# Patient Record
Sex: FEMALE | Race: WHITE | NOT HISPANIC OR LATINO
[De-identification: names, ages, dates, MRNs, and addresses within clinical notes are randomized per-mention and may not be internally consistent; named-entity substitution may affect disease eponyms.]

---

## 2017-01-23 ENCOUNTER — APPOINTMENT (OUTPATIENT)
Dept: INTERNAL MEDICINE | Facility: CLINIC | Age: 57
End: 2017-01-23

## 2017-01-23 ENCOUNTER — NON-APPOINTMENT (OUTPATIENT)
Age: 57
End: 2017-01-23

## 2017-01-23 VITALS
HEIGHT: 61 IN | SYSTOLIC BLOOD PRESSURE: 148 MMHG | BODY MASS INDEX: 33.04 KG/M2 | DIASTOLIC BLOOD PRESSURE: 82 MMHG | RESPIRATION RATE: 14 BRPM | HEART RATE: 82 BPM | OXYGEN SATURATION: 98 % | WEIGHT: 175 LBS | TEMPERATURE: 97.8 F

## 2017-02-17 ENCOUNTER — APPOINTMENT (OUTPATIENT)
Dept: NEUROLOGY | Facility: CLINIC | Age: 57
End: 2017-02-17

## 2017-02-17 ENCOUNTER — APPOINTMENT (OUTPATIENT)
Dept: OPHTHALMOLOGY | Facility: CLINIC | Age: 57
End: 2017-02-17

## 2017-02-21 ENCOUNTER — APPOINTMENT (OUTPATIENT)
Dept: NEUROLOGY | Facility: CLINIC | Age: 57
End: 2017-02-21

## 2017-02-21 VITALS
DIASTOLIC BLOOD PRESSURE: 66 MMHG | BODY MASS INDEX: 33.61 KG/M2 | HEART RATE: 84 BPM | WEIGHT: 178 LBS | SYSTOLIC BLOOD PRESSURE: 100 MMHG | HEIGHT: 61 IN

## 2017-02-27 ENCOUNTER — APPOINTMENT (OUTPATIENT)
Dept: INTERNAL MEDICINE | Facility: CLINIC | Age: 57
End: 2017-02-27

## 2017-02-27 VITALS
RESPIRATION RATE: 14 BRPM | WEIGHT: 178 LBS | SYSTOLIC BLOOD PRESSURE: 120 MMHG | DIASTOLIC BLOOD PRESSURE: 70 MMHG | BODY MASS INDEX: 33.61 KG/M2 | HEIGHT: 61 IN | HEART RATE: 78 BPM | OXYGEN SATURATION: 98 % | TEMPERATURE: 98 F

## 2017-02-28 LAB
ALBUMIN MFR SERPL ELPH: 59.8 %
ALBUMIN SERPL-MCNC: 3.8 G/DL
ALBUMIN/GLOB SERPL: 1.5 RATIO
ALPHA1 GLOB MFR SERPL ELPH: 4.5 %
ALPHA1 GLOB SERPL ELPH-MCNC: 0.3 G/DL
ALPHA2 GLOB MFR SERPL ELPH: 12.1 %
ALPHA2 GLOB SERPL ELPH-MCNC: 0.8 G/DL
B-GLOBULIN MFR SERPL ELPH: 12.1 %
B-GLOBULIN SERPL ELPH-MCNC: 0.8 G/DL
GAMMA GLOB FLD ELPH-MCNC: 0.7 G/DL
GAMMA GLOB MFR SERPL ELPH: 11.5 %
INTERPRETATION SERPL IEP-IMP: NORMAL
M PROTEIN SPEC IFE-MCNC: NORMAL
PROT SERPL-MCNC: 6.3 G/DL
PROT SERPL-MCNC: 6.3 G/DL

## 2017-03-02 ENCOUNTER — TRANSCRIPTION ENCOUNTER (OUTPATIENT)
Age: 57
End: 2017-03-02

## 2017-03-02 ENCOUNTER — OUTPATIENT (OUTPATIENT)
Dept: OUTPATIENT SERVICES | Facility: HOSPITAL | Age: 57
LOS: 1 days | End: 2017-03-02
Payer: COMMERCIAL

## 2017-03-02 ENCOUNTER — RX RENEWAL (OUTPATIENT)
Age: 57
End: 2017-03-02

## 2017-03-02 ENCOUNTER — APPOINTMENT (OUTPATIENT)
Dept: OPHTHALMOLOGY | Facility: CLINIC | Age: 57
End: 2017-03-02

## 2017-03-02 VITALS
HEIGHT: 62 IN | DIASTOLIC BLOOD PRESSURE: 59 MMHG | WEIGHT: 176.81 LBS | RESPIRATION RATE: 19 BRPM | SYSTOLIC BLOOD PRESSURE: 104 MMHG | OXYGEN SATURATION: 100 % | TEMPERATURE: 98 F | HEART RATE: 74 BPM

## 2017-03-02 VITALS
RESPIRATION RATE: 17 BRPM | HEART RATE: 76 BPM | SYSTOLIC BLOOD PRESSURE: 100 MMHG | OXYGEN SATURATION: 96 % | DIASTOLIC BLOOD PRESSURE: 54 MMHG

## 2017-03-02 DIAGNOSIS — H25.812 COMBINED FORMS OF AGE-RELATED CATARACT, LEFT EYE: ICD-10-CM

## 2017-03-02 DIAGNOSIS — Z98.891 HISTORY OF UTERINE SCAR FROM PREVIOUS SURGERY: Chronic | ICD-10-CM

## 2017-03-02 PROCEDURE — 66984 XCAPSL CTRC RMVL W/O ECP: CPT | Mod: LT

## 2017-03-02 NOTE — ASU DISCHARGE PLAN (ADULT/PEDIATRIC). - NOTIFY
Bleeding that does not stop/Pain not relieved by Medications/Fever greater than 101/Persistent Nausea and Vomiting Bleeding that does not stop/Swelling that continues/Pain not relieved by Medications/Fever greater than 101/Persistent Nausea and Vomiting

## 2017-03-02 NOTE — ASU PREOP CHECKLIST - AS TEMP SITE
Antibiotics sent in  Should not be performing a colonoscopy during an attack.  Should wait weeks/month after antibiotic is done and symptoms resolved   oral

## 2017-03-03 ENCOUNTER — APPOINTMENT (OUTPATIENT)
Dept: OPHTHALMOLOGY | Facility: CLINIC | Age: 57
End: 2017-03-03

## 2017-03-03 PROBLEM — E78.00 PURE HYPERCHOLESTEROLEMIA, UNSPECIFIED: Chronic | Status: ACTIVE | Noted: 2017-03-01

## 2017-03-09 ENCOUNTER — APPOINTMENT (OUTPATIENT)
Dept: OPHTHALMOLOGY | Facility: CLINIC | Age: 57
End: 2017-03-09

## 2017-04-06 ENCOUNTER — APPOINTMENT (OUTPATIENT)
Dept: OPHTHALMOLOGY | Facility: CLINIC | Age: 57
End: 2017-04-06

## 2017-04-27 ENCOUNTER — APPOINTMENT (OUTPATIENT)
Dept: OPHTHALMOLOGY | Facility: CLINIC | Age: 57
End: 2017-04-27

## 2017-05-08 ENCOUNTER — APPOINTMENT (OUTPATIENT)
Dept: INTERNAL MEDICINE | Facility: CLINIC | Age: 57
End: 2017-05-08

## 2017-05-10 ENCOUNTER — RX RENEWAL (OUTPATIENT)
Age: 57
End: 2017-05-10

## 2017-05-10 ENCOUNTER — NON-APPOINTMENT (OUTPATIENT)
Age: 57
End: 2017-05-10

## 2017-05-10 ENCOUNTER — APPOINTMENT (OUTPATIENT)
Dept: INTERNAL MEDICINE | Facility: CLINIC | Age: 57
End: 2017-05-10

## 2017-05-10 VITALS
HEIGHT: 61 IN | RESPIRATION RATE: 14 BRPM | OXYGEN SATURATION: 98 % | BODY MASS INDEX: 37.38 KG/M2 | HEART RATE: 95 BPM | WEIGHT: 198 LBS | TEMPERATURE: 98 F | DIASTOLIC BLOOD PRESSURE: 70 MMHG | SYSTOLIC BLOOD PRESSURE: 120 MMHG

## 2017-05-10 DIAGNOSIS — Z01.818 ENCOUNTER FOR OTHER PREPROCEDURAL EXAMINATION: ICD-10-CM

## 2017-05-18 ENCOUNTER — APPOINTMENT (OUTPATIENT)
Dept: OPHTHALMOLOGY | Facility: HOSPITAL | Age: 57
End: 2017-05-18

## 2017-05-18 ENCOUNTER — OUTPATIENT (OUTPATIENT)
Dept: OUTPATIENT SERVICES | Facility: HOSPITAL | Age: 57
LOS: 1 days | End: 2017-05-18
Payer: COMMERCIAL

## 2017-05-18 VITALS
DIASTOLIC BLOOD PRESSURE: 62 MMHG | RESPIRATION RATE: 22 BRPM | HEART RATE: 77 BPM | OXYGEN SATURATION: 97 % | SYSTOLIC BLOOD PRESSURE: 100 MMHG

## 2017-05-18 VITALS
WEIGHT: 199.3 LBS | HEIGHT: 62 IN | OXYGEN SATURATION: 98 % | DIASTOLIC BLOOD PRESSURE: 72 MMHG | TEMPERATURE: 99 F | SYSTOLIC BLOOD PRESSURE: 125 MMHG | RESPIRATION RATE: 16 BRPM | HEART RATE: 76 BPM

## 2017-05-18 DIAGNOSIS — Z98.891 HISTORY OF UTERINE SCAR FROM PREVIOUS SURGERY: Chronic | ICD-10-CM

## 2017-05-18 DIAGNOSIS — Z98.49 CATARACT EXTRACTION STATUS, UNSPECIFIED EYE: Chronic | ICD-10-CM

## 2017-05-18 DIAGNOSIS — H25.811 COMBINED FORMS OF AGE-RELATED CATARACT, RIGHT EYE: ICD-10-CM

## 2017-05-18 PROCEDURE — 66984 XCAPSL CTRC RMVL W/O ECP: CPT | Mod: RT

## 2017-05-18 PROCEDURE — 66984 XCAPSL CTRC RMVL W/O ECP: CPT | Mod: 79,RT

## 2017-05-18 NOTE — ASU DISCHARGE PLAN (ADULT/PEDIATRIC). - PT EDUC
Implant card (specify)/Intraocular lens implant(IOL), Eye shield with instructions , sunglasses and eye kit given to patient./other (specify)

## 2017-05-18 NOTE — ASU DISCHARGE PLAN (ADULT/PEDIATRIC). - NOTIFY
Fever greater than 101/Pain not relieved by Medications/Bleeding that does not stop/Swelling that continues/Persistent Nausea and Vomiting

## 2017-05-19 ENCOUNTER — APPOINTMENT (OUTPATIENT)
Dept: OPHTHALMOLOGY | Facility: CLINIC | Age: 57
End: 2017-05-19

## 2017-05-20 ENCOUNTER — TRANSCRIPTION ENCOUNTER (OUTPATIENT)
Age: 57
End: 2017-05-20

## 2017-05-22 ENCOUNTER — APPOINTMENT (OUTPATIENT)
Dept: NEUROLOGY | Facility: CLINIC | Age: 57
End: 2017-05-22

## 2017-05-23 ENCOUNTER — APPOINTMENT (OUTPATIENT)
Dept: OPHTHALMOLOGY | Facility: CLINIC | Age: 57
End: 2017-05-23

## 2017-05-25 ENCOUNTER — APPOINTMENT (OUTPATIENT)
Dept: OPHTHALMOLOGY | Facility: CLINIC | Age: 57
End: 2017-05-25

## 2017-05-30 ENCOUNTER — APPOINTMENT (OUTPATIENT)
Dept: NEUROLOGY | Facility: CLINIC | Age: 57
End: 2017-05-30

## 2017-06-19 ENCOUNTER — RX RENEWAL (OUTPATIENT)
Age: 57
End: 2017-06-19

## 2017-07-10 ENCOUNTER — APPOINTMENT (OUTPATIENT)
Dept: NEUROLOGY | Facility: CLINIC | Age: 57
End: 2017-07-10

## 2017-07-10 VITALS
HEART RATE: 77 BPM | DIASTOLIC BLOOD PRESSURE: 75 MMHG | WEIGHT: 198 LBS | SYSTOLIC BLOOD PRESSURE: 118 MMHG | BODY MASS INDEX: 37.38 KG/M2 | HEIGHT: 61 IN

## 2017-07-13 ENCOUNTER — APPOINTMENT (OUTPATIENT)
Dept: OPHTHALMOLOGY | Facility: CLINIC | Age: 57
End: 2017-07-13

## 2017-07-13 DIAGNOSIS — H25.13 AGE-RELATED NUCLEAR CATARACT, BILATERAL: ICD-10-CM

## 2017-09-12 ENCOUNTER — RX RENEWAL (OUTPATIENT)
Age: 57
End: 2017-09-12

## 2017-09-12 RX ORDER — GABAPENTIN 800 MG/1
800 TABLET, COATED ORAL 3 TIMES DAILY
Qty: 90 | Refills: 6 | Status: ACTIVE | COMMUNITY
Start: 2017-02-21 | End: 1900-01-01

## 2017-11-13 ENCOUNTER — TRANSCRIPTION ENCOUNTER (OUTPATIENT)
Age: 57
End: 2017-11-13

## 2017-12-14 ENCOUNTER — RESULT REVIEW (OUTPATIENT)
Age: 57
End: 2017-12-14

## 2017-12-18 ENCOUNTER — OUTPATIENT (OUTPATIENT)
Dept: OUTPATIENT SERVICES | Facility: HOSPITAL | Age: 57
LOS: 1 days | End: 2017-12-18
Payer: COMMERCIAL

## 2017-12-18 ENCOUNTER — APPOINTMENT (OUTPATIENT)
Dept: MAMMOGRAPHY | Facility: CLINIC | Age: 57
End: 2017-12-18
Payer: COMMERCIAL

## 2017-12-18 ENCOUNTER — APPOINTMENT (OUTPATIENT)
Dept: RADIOLOGY | Facility: CLINIC | Age: 57
End: 2017-12-18
Payer: COMMERCIAL

## 2017-12-18 DIAGNOSIS — Z12.31 ENCOUNTER FOR SCREENING MAMMOGRAM FOR MALIGNANT NEOPLASM OF BREAST: ICD-10-CM

## 2017-12-18 DIAGNOSIS — Z13.820 ENCOUNTER FOR SCREENING FOR OSTEOPOROSIS: ICD-10-CM

## 2017-12-18 DIAGNOSIS — Z98.49 CATARACT EXTRACTION STATUS, UNSPECIFIED EYE: Chronic | ICD-10-CM

## 2017-12-18 DIAGNOSIS — Z98.891 HISTORY OF UTERINE SCAR FROM PREVIOUS SURGERY: Chronic | ICD-10-CM

## 2017-12-18 DIAGNOSIS — Z00.8 ENCOUNTER FOR OTHER GENERAL EXAMINATION: ICD-10-CM

## 2017-12-18 PROCEDURE — 77080 DXA BONE DENSITY AXIAL: CPT

## 2017-12-18 PROCEDURE — G0202: CPT | Mod: 26

## 2017-12-18 PROCEDURE — 77063 BREAST TOMOSYNTHESIS BI: CPT | Mod: 26

## 2017-12-18 PROCEDURE — 77063 BREAST TOMOSYNTHESIS BI: CPT

## 2017-12-18 PROCEDURE — 77067 SCR MAMMO BI INCL CAD: CPT

## 2017-12-18 PROCEDURE — 77080 DXA BONE DENSITY AXIAL: CPT | Mod: 26

## 2018-02-16 ENCOUNTER — APPOINTMENT (OUTPATIENT)
Dept: NEUROLOGY | Facility: CLINIC | Age: 58
End: 2018-02-16
Payer: COMMERCIAL

## 2018-02-16 VITALS
BODY MASS INDEX: 38.71 KG/M2 | HEART RATE: 85 BPM | HEIGHT: 61 IN | SYSTOLIC BLOOD PRESSURE: 135 MMHG | WEIGHT: 205 LBS | DIASTOLIC BLOOD PRESSURE: 81 MMHG

## 2018-02-16 DIAGNOSIS — M79.2 NEURALGIA AND NEURITIS, UNSPECIFIED: ICD-10-CM

## 2018-02-16 PROCEDURE — 99213 OFFICE O/P EST LOW 20 MIN: CPT

## 2018-07-24 ENCOUNTER — APPOINTMENT (OUTPATIENT)
Dept: OPHTHALMOLOGY | Facility: CLINIC | Age: 58
End: 2018-07-24
Payer: COMMERCIAL

## 2018-07-24 PROCEDURE — 92014 COMPRE OPH EXAM EST PT 1/>: CPT

## 2018-08-16 ENCOUNTER — APPOINTMENT (OUTPATIENT)
Dept: NEUROLOGY | Facility: CLINIC | Age: 58
End: 2018-08-16

## 2018-11-27 ENCOUNTER — APPOINTMENT (OUTPATIENT)
Dept: BARIATRICS | Facility: CLINIC | Age: 58
End: 2018-11-27
Payer: COMMERCIAL

## 2018-11-27 VITALS
OXYGEN SATURATION: 96 % | DIASTOLIC BLOOD PRESSURE: 70 MMHG | BODY MASS INDEX: 42.58 KG/M2 | WEIGHT: 225.53 LBS | HEIGHT: 61 IN | HEART RATE: 80 BPM | SYSTOLIC BLOOD PRESSURE: 140 MMHG

## 2018-11-27 DIAGNOSIS — Z02.82 ENCOUNTER FOR ADOPTION SERVICES: ICD-10-CM

## 2018-11-27 DIAGNOSIS — R53.81 OTHER MALAISE: ICD-10-CM

## 2018-11-27 DIAGNOSIS — R53.83 OTHER MALAISE: ICD-10-CM

## 2018-11-27 PROCEDURE — 99244 OFF/OP CNSLTJ NEW/EST MOD 40: CPT

## 2018-11-27 RX ORDER — PREDNISOLONE ACETATE 10 MG/ML
1 SUSPENSION/ DROPS OPHTHALMIC 4 TIMES DAILY
Qty: 5 | Refills: 1 | Status: COMPLETED | COMMUNITY
Start: 2017-02-28 | End: 2018-11-27

## 2018-11-27 RX ORDER — MOXIFLOXACIN HYDROCHLORIDE 5 MG/ML
0.5 SOLUTION/ DROPS OPHTHALMIC 4 TIMES DAILY
Qty: 3 | Refills: 1 | Status: COMPLETED | COMMUNITY
Start: 2017-02-28 | End: 2018-11-27

## 2018-11-27 RX ORDER — OFLOXACIN 3 MG/ML
0.3 SOLUTION/ DROPS OPHTHALMIC 4 TIMES DAILY
Qty: 5 | Refills: 2 | Status: COMPLETED | COMMUNITY
Start: 2017-05-10 | End: 2018-11-27

## 2018-11-27 RX ORDER — PREDNISOLONE ACETATE 10 MG/ML
1 SUSPENSION/ DROPS OPHTHALMIC 4 TIMES DAILY
Qty: 10 | Refills: 2 | Status: COMPLETED | COMMUNITY
Start: 2017-05-10 | End: 2018-11-27

## 2018-11-27 RX ORDER — EMPAGLIFLOZIN 25 MG/1
25 TABLET, FILM COATED ORAL DAILY
Refills: 0 | Status: COMPLETED | COMMUNITY
Start: 2017-02-27 | End: 2018-11-27

## 2019-01-02 ENCOUNTER — OUTPATIENT (OUTPATIENT)
Dept: OUTPATIENT SERVICES | Facility: HOSPITAL | Age: 59
LOS: 1 days | End: 2019-01-02
Payer: COMMERCIAL

## 2019-01-02 ENCOUNTER — APPOINTMENT (OUTPATIENT)
Dept: MAMMOGRAPHY | Facility: CLINIC | Age: 59
End: 2019-01-02
Payer: COMMERCIAL

## 2019-01-02 ENCOUNTER — APPOINTMENT (OUTPATIENT)
Dept: ULTRASOUND IMAGING | Facility: CLINIC | Age: 59
End: 2019-01-02
Payer: COMMERCIAL

## 2019-01-02 DIAGNOSIS — Z98.49 CATARACT EXTRACTION STATUS, UNSPECIFIED EYE: Chronic | ICD-10-CM

## 2019-01-02 DIAGNOSIS — Z00.8 ENCOUNTER FOR OTHER GENERAL EXAMINATION: ICD-10-CM

## 2019-01-02 DIAGNOSIS — Z98.891 HISTORY OF UTERINE SCAR FROM PREVIOUS SURGERY: Chronic | ICD-10-CM

## 2019-01-02 PROCEDURE — 93970 EXTREMITY STUDY: CPT | Mod: 26

## 2019-01-02 PROCEDURE — 93970 EXTREMITY STUDY: CPT

## 2019-01-02 PROCEDURE — 77067 SCR MAMMO BI INCL CAD: CPT | Mod: 26

## 2019-01-02 PROCEDURE — 77063 BREAST TOMOSYNTHESIS BI: CPT | Mod: 26

## 2019-01-02 PROCEDURE — 77067 SCR MAMMO BI INCL CAD: CPT

## 2019-01-02 PROCEDURE — 76700 US EXAM ABDOM COMPLETE: CPT | Mod: 26

## 2019-01-02 PROCEDURE — 77063 BREAST TOMOSYNTHESIS BI: CPT

## 2019-01-02 PROCEDURE — 76700 US EXAM ABDOM COMPLETE: CPT

## 2019-01-07 ENCOUNTER — APPOINTMENT (OUTPATIENT)
Dept: INTERNAL MEDICINE | Facility: CLINIC | Age: 59
End: 2019-01-07
Payer: COMMERCIAL

## 2019-01-07 VITALS
WEIGHT: 225 LBS | HEIGHT: 61 IN | SYSTOLIC BLOOD PRESSURE: 126 MMHG | BODY MASS INDEX: 42.48 KG/M2 | OXYGEN SATURATION: 99 % | RESPIRATION RATE: 14 BRPM | HEART RATE: 91 BPM | DIASTOLIC BLOOD PRESSURE: 78 MMHG

## 2019-01-07 PROCEDURE — 99213 OFFICE O/P EST LOW 20 MIN: CPT

## 2019-01-07 NOTE — HISTORY OF PRESENT ILLNESS
[FreeTextEntry1] : obesity [de-identified] : Pt needs work up for bariatric surgery. She has all her appts scheduled. Planning to do gastric sleeve. Has been overweight her whole life. Diabetic and unable to lose weight. Exercises regularly at orange [employer] and not losing weight. Has diabetes for 15 yrs.

## 2019-01-07 NOTE — PHYSICAL EXAM
[No Acute Distress] : no acute distress [Well Nourished] : well nourished [Well Developed] : well developed [Well-Appearing] : well-appearing [Normal Rate] : normal rate  [Regular Rhythm] : with a regular rhythm [No Edema] : there was no peripheral edema [Soft] : abdomen soft [Non Tender] : non-tender [Normal Affect] : the affect was normal [Normal Insight/Judgement] : insight and judgment were intact

## 2019-01-18 ENCOUNTER — APPOINTMENT (OUTPATIENT)
Dept: CARDIOLOGY | Facility: CLINIC | Age: 59
End: 2019-01-18
Payer: COMMERCIAL

## 2019-01-18 ENCOUNTER — APPOINTMENT (OUTPATIENT)
Dept: GASTROENTEROLOGY | Facility: CLINIC | Age: 59
End: 2019-01-18
Payer: COMMERCIAL

## 2019-01-18 ENCOUNTER — NON-APPOINTMENT (OUTPATIENT)
Age: 59
End: 2019-01-18

## 2019-01-18 VITALS
DIASTOLIC BLOOD PRESSURE: 75 MMHG | WEIGHT: 235 LBS | BODY MASS INDEX: 44.37 KG/M2 | HEIGHT: 61 IN | OXYGEN SATURATION: 95 % | SYSTOLIC BLOOD PRESSURE: 123 MMHG | HEART RATE: 79 BPM

## 2019-01-18 VITALS
DIASTOLIC BLOOD PRESSURE: 80 MMHG | WEIGHT: 235 LBS | HEART RATE: 75 BPM | HEIGHT: 61 IN | SYSTOLIC BLOOD PRESSURE: 132 MMHG | OXYGEN SATURATION: 94 % | BODY MASS INDEX: 44.37 KG/M2

## 2019-01-18 DIAGNOSIS — Z01.810 ENCOUNTER FOR PREPROCEDURAL CARDIOVASCULAR EXAMINATION: ICD-10-CM

## 2019-01-18 DIAGNOSIS — Z01.818 ENCOUNTER FOR OTHER PREPROCEDURAL EXAMINATION: ICD-10-CM

## 2019-01-18 DIAGNOSIS — Z12.11 ENCOUNTER FOR SCREENING FOR MALIGNANT NEOPLASM OF COLON: ICD-10-CM

## 2019-01-18 PROCEDURE — 99244 OFF/OP CNSLTJ NEW/EST MOD 40: CPT

## 2019-01-18 PROCEDURE — 99245 OFF/OP CONSLTJ NEW/EST HI 55: CPT | Mod: 25

## 2019-01-18 PROCEDURE — 93000 ELECTROCARDIOGRAM COMPLETE: CPT

## 2019-01-20 NOTE — PHYSICAL EXAM
[General Appearance - Alert] : alert [General Appearance - In No Acute Distress] : in no acute distress [Sclera] : the sclera and conjunctiva were normal [Extraocular Movements] : extraocular movements were intact [Outer Ear] : the ears and nose were normal in appearance [Hearing Threshold Finger Rub Not [place]] : hearing was normal [Neck Appearance] : the appearance of the neck was normal [Neck Cervical Mass (___cm)] : no neck mass was observed [Auscultation Breath Sounds / Voice Sounds] : lungs were clear to auscultation bilaterally [Heart Rate And Rhythm] : heart rate was normal and rhythm regular [Heart Sounds] : normal S1 and S2 [Heart Sounds Gallop] : no gallops [Murmurs] : no murmurs [Heart Sounds Pericardial Friction Rub] : no pericardial rub [Bowel Sounds] : normal bowel sounds [Abdomen Soft] : soft [Abdomen Tenderness] : non-tender [Abdomen Mass (___ Cm)] : no abdominal mass palpated [Cervical Lymph Nodes Enlarged Posterior Bilaterally] : posterior cervical [Cervical Lymph Nodes Enlarged Anterior Bilaterally] : anterior cervical [Abnormal Walk] : normal gait [Nail Clubbing] : no clubbing  or cyanosis of the fingernails [Skin Color & Pigmentation] : normal skin color and pigmentation [] : no rash [Oriented To Time, Place, And Person] : oriented to person, place, and time [Impaired Insight] : insight and judgment were intact [Affect] : the affect was normal [FreeTextEntry1] : Central obesity

## 2019-01-20 NOTE — ASSESSMENT
[FreeTextEntry1] : 58-year-old woman with history of diabetes, hypertension, obesity who is considering gastric sleeve. She is here for evaluation of an upper endoscopy.\par \par I had a long discussion with the patient with regards to getting ready for a bariatric surgery. She does need an upper endoscopy to make sure she doesn't have any peptic ulcer disease, upper GI tract lesions, Law's esophagus, large hiatal hernia etc. This will be done with anesthesia. The risks of the procedure including perforation, bleeding, and risks of anesthesia were discussed with the patient who wants to proceed. \par \par Since she has never had a colonoscopy as screening to prevent colon cancer, I have discussed the procedure with her to r/o colon polyps etc under MAC.  Risks of colonoscopy were also reviewed including perforation, bleeding, infection, risks of anesthesia were discussed and patient would like to proceed.  \par \par The patient would like to have both procedures at the same time in a hospital setting.\par \par

## 2019-01-20 NOTE — HISTORY OF PRESENT ILLNESS
[de-identified] : 58-year-old woman with history of diabetes, hypertension, obesity who is considering gastric sleeve. She is here for evaluation of an upper endoscopy.\par \par The patient reports feeling well and denies having any heartburn, reflux, dysphasia, odynophagia, nausea, vomiting, constipation, diarrhea, melena and bright red blood per rectum.\par \par She's never had an upper endoscopy or a routine screening colonoscopy.\par \par She denies any family history of gastrointestinal cancers.\par \par She's never been a smoker and has been a social drinker\par \par All other review of systems are negative.  Denies cardiac symptoms.\par

## 2019-01-24 ENCOUNTER — APPOINTMENT (OUTPATIENT)
Dept: CARDIOLOGY | Facility: CLINIC | Age: 59
End: 2019-01-24
Payer: COMMERCIAL

## 2019-01-24 LAB
25(OH)D3 SERPL-MCNC: 26.4 NG/ML
ALBUMIN SERPL ELPH-MCNC: 4.2 G/DL
ALP BLD-CCNC: 74 U/L
ALT SERPL-CCNC: 19 U/L
ANION GAP SERPL CALC-SCNC: 12 MMOL/L
AST SERPL-CCNC: 17 U/L
BASOPHILS # BLD AUTO: 0.05 K/UL
BASOPHILS NFR BLD AUTO: 0.7 %
BILIRUB SERPL-MCNC: 0.4 MG/DL
BUN SERPL-MCNC: 20 MG/DL
CALCIUM SERPL-MCNC: 9.2 MG/DL
CHLORIDE SERPL-SCNC: 99 MMOL/L
CHOLEST SERPL-MCNC: 201 MG/DL
CHOLEST/HDLC SERPL: 4 RATIO
CO2 SERPL-SCNC: 26 MMOL/L
CREAT SERPL-MCNC: 0.91 MG/DL
EOSINOPHIL # BLD AUTO: 0.25 K/UL
EOSINOPHIL NFR BLD AUTO: 3.6 %
FERRITIN SERPL-MCNC: 57 NG/ML
FOLATE SERPL-MCNC: 17.7 NG/ML
GLUCOSE SERPL-MCNC: 146 MG/DL
HBA1C MFR BLD HPLC: 8.6 %
HCT VFR BLD CALC: 43.7 %
HDLC SERPL-MCNC: 50 MG/DL
HGB BLD-MCNC: 13.9 G/DL
IMM GRANULOCYTES NFR BLD AUTO: 0.3 %
INR PPP: 0.91 RATIO
IRON SATN MFR SERPL: 15 %
IRON SERPL-MCNC: 55 UG/DL
LDLC SERPL CALC-MCNC: 119 MG/DL
LYMPHOCYTES # BLD AUTO: 2.1 K/UL
LYMPHOCYTES NFR BLD AUTO: 30.3 %
MAN DIFF?: NORMAL
MCHC RBC-ENTMCNC: 27.8 PG
MCHC RBC-ENTMCNC: 31.8 GM/DL
MCV RBC AUTO: 87.4 FL
MONOCYTES # BLD AUTO: 0.59 K/UL
MONOCYTES NFR BLD AUTO: 8.5 %
NEUTROPHILS # BLD AUTO: 3.92 K/UL
NEUTROPHILS NFR BLD AUTO: 56.6 %
PLATELET # BLD AUTO: 376 K/UL
POTASSIUM SERPL-SCNC: 4.2 MMOL/L
PROT SERPL-MCNC: 7.1 G/DL
PT BLD: 10.3 SEC
RBC # BLD: 5 M/UL
RBC # FLD: 14.7 %
SODIUM SERPL-SCNC: 136 MMOL/L
T4 FREE SERPL-MCNC: 1.1 NG/DL
TIBC SERPL-MCNC: 378 UG/DL
TRIGL SERPL-MCNC: 160 MG/DL
TSH SERPL-ACNC: 2.07 UIU/ML
UIBC SERPL-MCNC: 323 UG/DL
VIT A SERPL-MCNC: 67 UG/DL
VIT B1 SERPL-MCNC: 162.1 NMOL/L
VIT B12 SERPL-MCNC: 983 PG/ML
VIT B2 SERPL-MCNC: 215 UG/L
VIT B6 SERPL-MCNC: 6 UG/L
WBC # FLD AUTO: 6.93 K/UL
ZINC SERPL-MCNC: 66 UG/DL

## 2019-01-24 PROCEDURE — 93015 CV STRESS TEST SUPVJ I&R: CPT

## 2019-01-24 PROCEDURE — 93306 TTE W/DOPPLER COMPLETE: CPT

## 2019-01-24 PROCEDURE — 78452 HT MUSCLE IMAGE SPECT MULT: CPT

## 2019-01-24 PROCEDURE — A9500: CPT

## 2019-01-30 ENCOUNTER — APPOINTMENT (OUTPATIENT)
Dept: BARIATRICS | Facility: CLINIC | Age: 59
End: 2019-01-30
Payer: COMMERCIAL

## 2019-01-30 ENCOUNTER — APPOINTMENT (OUTPATIENT)
Dept: BARIATRICS/WEIGHT MGMT | Facility: CLINIC | Age: 59
End: 2019-01-30
Payer: COMMERCIAL

## 2019-01-30 VITALS
BODY MASS INDEX: 44.54 KG/M2 | WEIGHT: 235.89 LBS | HEIGHT: 61 IN | HEART RATE: 77 BPM | OXYGEN SATURATION: 93 % | SYSTOLIC BLOOD PRESSURE: 118 MMHG | DIASTOLIC BLOOD PRESSURE: 80 MMHG

## 2019-01-30 DIAGNOSIS — E11.42 TYPE 2 DIABETES MELLITUS WITH DIABETIC POLYNEUROPATHY: ICD-10-CM

## 2019-01-30 PROCEDURE — 90791 PSYCH DIAGNOSTIC EVALUATION: CPT

## 2019-01-30 PROCEDURE — 99214 OFFICE O/P EST MOD 30 MIN: CPT

## 2019-01-31 ENCOUNTER — APPOINTMENT (OUTPATIENT)
Dept: CARDIOLOGY | Facility: CLINIC | Age: 59
End: 2019-01-31

## 2019-02-04 NOTE — ASSESSMENT
[FreeTextEntry1] : 58 year old F undergoing workup for laparoscopic sleeve gastrectomy here for PRE OP  VISIT. Weight gain since last visit.  Patient is currently in the process of completing her workup as well as a medically supervised diet.\par \par Will schedule surgery once workup is complete.DISCUSSED AND REVIEWED FURTHER REQUIREMENTS NEEDED PRIOR TO SCHEDULING SURGERY. \par \par Nutrition pre op classes scheduled, One on one with nutritionist - 2/14/2019, Psych consult completed on 1/30/2019, Needs letter of support, Upper EGD scheduled for next week. Needs final cardiac and pulmonary clearance and testing forwarded to office. Endocrine clearance and instructions regarding oral medication and insulin. Completed - diet history, routine labs, and imaging studies.\par \par \par Nutritional counseling has been provided. The patient is encouraged to remain calorie conscious and continue a low fat, low carbohydrate, protein focus diet. Pt encouraged to participate in a daily exercise regimen incorporating cardio and strength training. \par \par Return to office in 4-6 weeks or earlier if any concerns. \par

## 2019-02-04 NOTE — REVIEW OF SYSTEMS
[Recent Change In Weight] : ~T recent weight change [Negative] : Heme/Lymph [Fever] : no fever [Dysphagia] : no dysphagia [Chest Pain] : no chest pain [Palpitations] : no palpitations [Shortness Of Breath] : no shortness of breath [Wheezing] : no wheezing [Cough] : no cough [SOB on Exertion] : no shortness of breath during exertion [Abdominal Pain] : no abdominal pain [Vomiting] : no vomiting [Constipation] : no constipation [Diarrhea] : no diarrhea [Reflux/Heartburn] : no reflux/ heartburn [Dysuria] : no dysuria [Incontinence] : no incontinence [Joint Pain] : no joint pain [Joint Stiffness] : no joint stiffness [Muscle Pain] : no muscle pain [FreeTextEntry2] : weight loss since last visit.

## 2019-02-04 NOTE — HISTORY OF PRESENT ILLNESS
[de-identified] : 58 year old F undergoing workup for laparoscopic sleeve gastrectomy here for PRE OP  VISIT. Weight gain since last visit.  Patient is currently in the process of completing her workup as well as a medically supervised diet. Patient is making efforts to improve food choices and increased activity.Pt is following a protein focused diet - 3 meals a day - consuming a sufficient amount of zero calorie liquid.  Patient knows she needs to lose weight prior to surgery.Exercising regularly as recommended .All questions were answered.FURTHER REQUIREMENTS PRIOR TO SCHEDULING SURGERY : Nutrition pre op classes scheduled, One on one with nutritionist - 2/14/2019, Psych consult completed on 1/30/2019, Needs letter of support, Upper EGD scheduled for next week. Needs final cardiac and pulmonary clearance and testing forwarded to office. Endocrine clearance and instructions regarding oral medication and insulin. \par

## 2019-02-04 NOTE — PHYSICAL EXAM
[Obese, well nourished, in no acute distress] : obese, well nourished, in no acute distress [Normal] : affect appropriate [de-identified] : normoactive bowel sounds, soft and non tender, no hepatosplenomegaly or masses appreciated.

## 2019-02-06 ENCOUNTER — TRANSCRIPTION ENCOUNTER (OUTPATIENT)
Age: 59
End: 2019-02-06

## 2019-02-07 ENCOUNTER — APPOINTMENT (OUTPATIENT)
Dept: GASTROENTEROLOGY | Facility: HOSPITAL | Age: 59
End: 2019-02-07

## 2019-02-07 ENCOUNTER — RESULT REVIEW (OUTPATIENT)
Age: 59
End: 2019-02-07

## 2019-02-07 ENCOUNTER — OUTPATIENT (OUTPATIENT)
Dept: OUTPATIENT SERVICES | Facility: HOSPITAL | Age: 59
LOS: 1 days | End: 2019-02-07
Payer: COMMERCIAL

## 2019-02-07 DIAGNOSIS — Z98.891 HISTORY OF UTERINE SCAR FROM PREVIOUS SURGERY: Chronic | ICD-10-CM

## 2019-02-07 DIAGNOSIS — Z01.818 ENCOUNTER FOR OTHER PREPROCEDURAL EXAMINATION: ICD-10-CM

## 2019-02-07 DIAGNOSIS — K20.9 ESOPHAGITIS, UNSPECIFIED: ICD-10-CM

## 2019-02-07 DIAGNOSIS — Z98.49 CATARACT EXTRACTION STATUS, UNSPECIFIED EYE: Chronic | ICD-10-CM

## 2019-02-07 DIAGNOSIS — Z12.11 ENCOUNTER FOR SCREENING FOR MALIGNANT NEOPLASM OF COLON: ICD-10-CM

## 2019-02-07 PROCEDURE — 88305 TISSUE EXAM BY PATHOLOGIST: CPT

## 2019-02-07 PROCEDURE — 88312 SPECIAL STAINS GROUP 1: CPT | Mod: 26

## 2019-02-07 PROCEDURE — 88312 SPECIAL STAINS GROUP 1: CPT

## 2019-02-07 PROCEDURE — 88305 TISSUE EXAM BY PATHOLOGIST: CPT | Mod: 26

## 2019-02-07 PROCEDURE — 88313 SPECIAL STAINS GROUP 2: CPT | Mod: 26

## 2019-02-07 PROCEDURE — 43239 EGD BIOPSY SINGLE/MULTIPLE: CPT

## 2019-02-07 PROCEDURE — 45380 COLONOSCOPY AND BIOPSY: CPT | Mod: PT

## 2019-02-07 PROCEDURE — 88313 SPECIAL STAINS GROUP 2: CPT

## 2019-02-07 PROCEDURE — 45380 COLONOSCOPY AND BIOPSY: CPT | Mod: 33

## 2019-02-07 PROCEDURE — 82962 GLUCOSE BLOOD TEST: CPT

## 2019-02-08 NOTE — REVIEW OF SYSTEMS
[Recent Weight Gain (___ Lbs)] : recent [unfilled] ~Ulb weight gain [Negative] : Heme/Lymph [Chest  Pressure] : no chest pressure [Chest Pain] : no chest pain [Palpitations] : no palpitations

## 2019-02-08 NOTE — HISTORY OF PRESENT ILLNESS
[FreeTextEntry1] : Erik Ortiz is a 58 year old woman with a history of diabetes mellitus complicated by peripheral neuropathy, hypertension, morbid obesity, hypertension, obstructive sleep apnea, and hyperlipidemia who presents for cardiology examination prior to upcoming bariatric surgery - she plans to have laparoscopic sleeve for gastrectomy with Dr. Neal in the upcoming months.  She has no known history of heart disease - family history is nebulous because she is adopted.  She describes a good functional status and exercise capacity.  She experiences dyspnea with exertion that she attributes to deconditioning but no angina.  She has had previous surgical procedures ( section, cataract surgery); no previous complications from anesthesia or bleeding.

## 2019-02-08 NOTE — PHYSICAL EXAM
[Well Groomed] : well groomed [General Appearance - In No Acute Distress] : no acute distress [Eyelids - No Xanthelasma] : the eyelids demonstrated no xanthelasmas [No Oral Cyanosis] : no oral cyanosis [Heart Rate And Rhythm] : heart rate and rhythm were normal [Heart Sounds] : normal S1 and S2 [Edema] : no peripheral edema present [Respiration, Rhythm And Depth] : normal respiratory rhythm and effort [Auscultation Breath Sounds / Voice Sounds] : lungs were clear to auscultation bilaterally [Bowel Sounds] : normal bowel sounds [Abdomen Soft] : soft [Abdomen Tenderness] : non-tender [Abnormal Walk] : normal gait [Gait - Sufficient For Exercise Testing] : the gait was sufficient for exercise testing [Nail Clubbing] : no clubbing of the fingernails [Cyanosis, Localized] : no localized cyanosis [] : no rash [Oriented To Time, Place, And Person] : oriented to person, place, and time [Impaired Insight] : insight and judgment were intact [Affect] : the affect was normal [Mood] : the mood was normal [FreeTextEntry1] : No carotid bruit

## 2019-02-08 NOTE — DISCUSSION/SUMMARY
[FreeTextEntry1] : \par Preoperative cardiac examination: Mrs. Ortiz has several clinical predictors of perioperative cardiac complications, including diabetes mellitus;  I recommend noninvasive cardiac testing prior to surgery  - she will return for echocardiography and ischemia evaluate (stress test); a final assessment of perioperative cardiac risk will be made once these tests have been completed.\par \par Hypertension: Satisfactory control; continue ramipril and HCTZ.\par \par Diabetes mellitus: Suboptimal control; patient says her hemoglobin A1c has varied between 7.5 and 13%; most recent measurement approximately 8%.

## 2019-02-08 NOTE — ADDENDUM
[FreeTextEntry1] : Addendum (2/8/19):\par Aleida Ortiz returned for exercise stress testing with myocardial perfusion imaging on January 24, 2019. She completed 7 minutes of the Conor protocol achieving 90% of maximal predicted heart rate and 8 METS.  She did not experience angina with exercise and there were no ischemic ECG changes or exercise induced arrhythmias.  Myocardial perfusion SPECT scan was normal with no clear evidence of infarction or inducible ischemia. LVEF was calculated at greater than 70%. Echocardiography demonstrated normal left ventricular size and systolic function, mild diastolic dysfunction, mild tricuspid regurgitation, minimal mitral regurgitation, and normal estimated pulmonary artery systolic pressure.\par \par Ms. Ortiz appears optimized from a cardiac standpoint for elective bariatric surgery; no additional cardiac testing is planned.

## 2019-02-14 ENCOUNTER — APPOINTMENT (OUTPATIENT)
Dept: BARIATRICS/WEIGHT MGMT | Facility: CLINIC | Age: 59
End: 2019-02-14

## 2019-02-15 PROBLEM — K20.9 ESOPHAGITIS: Status: ACTIVE | Noted: 2019-02-15

## 2019-02-21 ENCOUNTER — MESSAGE (OUTPATIENT)
Age: 59
End: 2019-02-21

## 2019-02-22 ENCOUNTER — APPOINTMENT (OUTPATIENT)
Dept: BARIATRICS/WEIGHT MGMT | Facility: CLINIC | Age: 59
End: 2019-02-22
Payer: COMMERCIAL

## 2019-02-22 VITALS — WEIGHT: 233 LBS | BODY MASS INDEX: 43.99 KG/M2 | HEIGHT: 61 IN

## 2019-02-22 PROCEDURE — 97802 MEDICAL NUTRITION INDIV IN: CPT

## 2019-03-06 ENCOUNTER — TRANSCRIPTION ENCOUNTER (OUTPATIENT)
Age: 59
End: 2019-03-06

## 2019-03-22 RX ORDER — SODIUM SULFATE, POTASSIUM SULFATE, MAGNESIUM SULFATE 17.5; 3.13; 1.6 G/ML; G/ML; G/ML
17.5-3.13-1.6 SOLUTION, CONCENTRATE ORAL
Qty: 1 | Refills: 0 | Status: DISCONTINUED | COMMUNITY
Start: 2019-01-20 | End: 2019-03-22

## 2019-03-26 ENCOUNTER — APPOINTMENT (OUTPATIENT)
Dept: BARIATRICS | Facility: CLINIC | Age: 59
End: 2019-03-26
Payer: COMMERCIAL

## 2019-03-26 ENCOUNTER — OUTPATIENT (OUTPATIENT)
Dept: OUTPATIENT SERVICES | Facility: HOSPITAL | Age: 59
LOS: 1 days | End: 2019-03-26
Payer: COMMERCIAL

## 2019-03-26 VITALS
WEIGHT: 235.01 LBS | HEIGHT: 61 IN | SYSTOLIC BLOOD PRESSURE: 126 MMHG | DIASTOLIC BLOOD PRESSURE: 80 MMHG | OXYGEN SATURATION: 98 % | HEART RATE: 80 BPM | BODY MASS INDEX: 44.37 KG/M2

## 2019-03-26 VITALS
HEIGHT: 61 IN | SYSTOLIC BLOOD PRESSURE: 122 MMHG | WEIGHT: 235.89 LBS | HEART RATE: 76 BPM | DIASTOLIC BLOOD PRESSURE: 70 MMHG | OXYGEN SATURATION: 97 % | RESPIRATION RATE: 16 BRPM | TEMPERATURE: 98 F

## 2019-03-26 DIAGNOSIS — Z13.21 ENCOUNTER FOR SCREENING FOR NUTRITIONAL DISORDER: ICD-10-CM

## 2019-03-26 DIAGNOSIS — Z98.891 HISTORY OF UTERINE SCAR FROM PREVIOUS SURGERY: Chronic | ICD-10-CM

## 2019-03-26 DIAGNOSIS — Z98.49 CATARACT EXTRACTION STATUS, UNSPECIFIED EYE: Chronic | ICD-10-CM

## 2019-03-26 DIAGNOSIS — Z01.818 ENCOUNTER FOR OTHER PREPROCEDURAL EXAMINATION: ICD-10-CM

## 2019-03-26 DIAGNOSIS — E66.01 MORBID (SEVERE) OBESITY DUE TO EXCESS CALORIES: ICD-10-CM

## 2019-03-26 DIAGNOSIS — I10 ESSENTIAL (PRIMARY) HYPERTENSION: ICD-10-CM

## 2019-03-26 DIAGNOSIS — Z13.220 ENCOUNTER FOR SCREENING FOR LIPOID DISORDERS: ICD-10-CM

## 2019-03-26 DIAGNOSIS — E11.9 TYPE 2 DIABETES MELLITUS WITHOUT COMPLICATIONS: ICD-10-CM

## 2019-03-26 DIAGNOSIS — Z13.6 ENCOUNTER FOR SCREENING FOR LIPOID DISORDERS: ICD-10-CM

## 2019-03-26 DIAGNOSIS — G47.33 OBSTRUCTIVE SLEEP APNEA (ADULT) (PEDIATRIC): ICD-10-CM

## 2019-03-26 DIAGNOSIS — Z13.29 ENCOUNTER FOR SCREENING FOR OTHER SUSPECTED ENDOCRINE DISORDER: ICD-10-CM

## 2019-03-26 PROBLEM — G47.30 SLEEP APNEA, UNSPECIFIED: Chronic | Status: ACTIVE | Noted: 2019-03-26

## 2019-03-26 PROBLEM — E11.40 TYPE 2 DIABETES MELLITUS WITH DIABETIC NEUROPATHY, UNSPECIFIED: Chronic | Status: ACTIVE | Noted: 2017-03-02

## 2019-03-26 LAB
ANION GAP SERPL CALC-SCNC: 9 MMOL/L — SIGNIFICANT CHANGE UP (ref 5–17)
BLD GP AB SCN SERPL QL: SIGNIFICANT CHANGE UP
BUN SERPL-MCNC: 25 MG/DL — HIGH (ref 7–23)
CALCIUM SERPL-MCNC: 10.2 MG/DL — SIGNIFICANT CHANGE UP (ref 8.4–10.5)
CHLORIDE SERPL-SCNC: 98 MMOL/L — SIGNIFICANT CHANGE UP (ref 96–108)
CO2 SERPL-SCNC: 28 MMOL/L — SIGNIFICANT CHANGE UP (ref 22–31)
CREAT SERPL-MCNC: 1.01 MG/DL — SIGNIFICANT CHANGE UP (ref 0.5–1.3)
GLUCOSE SERPL-MCNC: 182 MG/DL — HIGH (ref 70–99)
HBA1C BLD-MCNC: 8.4 % — HIGH (ref 4–5.6)
HCT VFR BLD CALC: 45.5 % — HIGH (ref 34.5–45)
HGB BLD-MCNC: 14.9 G/DL — SIGNIFICANT CHANGE UP (ref 11.5–15.5)
MCHC RBC-ENTMCNC: 27.6 PG — SIGNIFICANT CHANGE UP (ref 27–34)
MCHC RBC-ENTMCNC: 32.7 GM/DL — SIGNIFICANT CHANGE UP (ref 32–36)
MCV RBC AUTO: 84.3 FL — SIGNIFICANT CHANGE UP (ref 80–100)
NRBC # BLD: 0 /100 WBCS — SIGNIFICANT CHANGE UP (ref 0–0)
PLATELET # BLD AUTO: 402 K/UL — HIGH (ref 150–400)
POTASSIUM SERPL-MCNC: 4.8 MMOL/L — SIGNIFICANT CHANGE UP (ref 3.5–5.3)
POTASSIUM SERPL-SCNC: 4.8 MMOL/L — SIGNIFICANT CHANGE UP (ref 3.5–5.3)
RBC # BLD: 5.4 M/UL — HIGH (ref 3.8–5.2)
RBC # FLD: 13 % — SIGNIFICANT CHANGE UP (ref 10.3–14.5)
SODIUM SERPL-SCNC: 135 MMOL/L — SIGNIFICANT CHANGE UP (ref 135–145)
WBC # BLD: 9.49 K/UL — SIGNIFICANT CHANGE UP (ref 3.8–10.5)
WBC # FLD AUTO: 9.49 K/UL — SIGNIFICANT CHANGE UP (ref 3.8–10.5)

## 2019-03-26 PROCEDURE — 99214 OFFICE O/P EST MOD 30 MIN: CPT

## 2019-03-26 RX ORDER — DEXTROSE 50 % IN WATER 50 %
12.5 SYRINGE (ML) INTRAVENOUS ONCE
Qty: 0 | Refills: 0 | Status: DISCONTINUED | OUTPATIENT
Start: 2019-03-26 | End: 2019-04-10

## 2019-03-26 RX ORDER — DIETHYLPROPION HYDROCHLORIDE 25 MG/1
25 TABLET ORAL DAILY
Refills: 0 | Status: DISCONTINUED | COMMUNITY
End: 2019-03-26

## 2019-03-26 RX ORDER — GLIMEPIRIDE 1 MG
1 TABLET ORAL
Qty: 0 | Refills: 0 | COMMUNITY

## 2019-03-26 RX ORDER — DEXTROSE 50 % IN WATER 50 %
25 SYRINGE (ML) INTRAVENOUS ONCE
Qty: 0 | Refills: 0 | Status: DISCONTINUED | OUTPATIENT
Start: 2019-03-26 | End: 2019-04-10

## 2019-03-26 RX ORDER — DEXTROSE 50 % IN WATER 50 %
15 SYRINGE (ML) INTRAVENOUS ONCE
Qty: 0 | Refills: 0 | Status: DISCONTINUED | OUTPATIENT
Start: 2019-03-26 | End: 2019-04-10

## 2019-03-26 RX ORDER — SODIUM CHLORIDE 9 MG/ML
1000 INJECTION, SOLUTION INTRAVENOUS
Qty: 0 | Refills: 0 | Status: DISCONTINUED | OUTPATIENT
Start: 2019-03-26 | End: 2019-04-10

## 2019-03-26 RX ORDER — GLUCAGON INJECTION, SOLUTION 0.5 MG/.1ML
1 INJECTION, SOLUTION SUBCUTANEOUS ONCE
Qty: 0 | Refills: 0 | Status: DISCONTINUED | OUTPATIENT
Start: 2019-03-26 | End: 2019-04-10

## 2019-03-26 NOTE — H&P PST ADULT - NSICDXPASTMEDICALHX_GEN_ALL_CORE_FT
PAST MEDICAL HISTORY:  Diabetic neuropathy feet    Gastritis     Hypercholesterolemia     Hypertension     Sleep apnea CPAP for 1 1/2 months  nasal mask    Type 2 diabetes mellitus  PAST MEDICAL HISTORY:  Diabetic neuropathy feet    Gastritis     Hypercholesterolemia     Hypertension     Obesity, morbid, BMI 40.0-49.9     Sleep apnea CPAP for 1 1/2 months  nasal mask    Type 2 diabetes mellitus

## 2019-03-26 NOTE — H&P PST ADULT - NSICDXPROBLEM_GEN_ALL_CORE_FT
PROBLEM DIAGNOSES  Problem: Obesity, morbid, BMI 40.0-49.9  Assessment and Plan: LAPAROSCOPIC SLEEVE GASTRECTOMY  MEDICAL CLEARANCE  PRE OP INSTRUCTIONS      R/O PROBLEM DIAGNOSES  Problem: Sleep apnea  Assessment and Plan: RESPIRATORY CONSULT REQUESTED, UNABLE TO ATTEND  CPAP INSTRUCTIONS REVIEWED WITH PATIENT  INFORMATION FORWARDED TO RY    Problem: Type 2 diabetes mellitus  Assessment and Plan: INSTRUCTION GIVEN ON METFORMIN, TOUJEO AND SAXENDA  PATIENT WILL REFER TO ENDOCRINOLOGIST FOR ANY QUESTIONS

## 2019-03-26 NOTE — REVIEW OF SYSTEMS
[Negative] : Endocrine [Fever] : no fever [Chills] : no chills [Night Sweats] : no night sweats [Fatigue] : no fatigue [Recent Change In Weight] : ~T no recent weight change [Eye Pain] : no eye pain [Red Eyes] : eyes not red [Vision Problems] : no vision problems [Dysphagia] : no dysphagia [Hoarseness] : no hoarseness [Odynophagia] : no odynophagia [Chest Pain] : no chest pain [Palpitations] : no palpitations [Lower Ext Edema] : no lower extremity edema [Shortness Of Breath] : no shortness of breath [Wheezing] : no wheezing [Cough] : no cough [Abdominal Pain] : no abdominal pain [Vomiting] : no vomiting [Constipation] : no constipation [Diarrhea] : no diarrhea [Reflux/Heartburn] : no reflux/ heartburn [Dysuria] : no dysuria [Incontinence] : no incontinence [Joint Pain] : no joint pain [Joint Stiffness] : no joint stiffness [Skin Rash] : no skin rash [Confused] : no confusion [Dizziness] : no dizziness [Fainting] : no fainting

## 2019-03-26 NOTE — HISTORY OF PRESENT ILLNESS
[de-identified] : 58 year old female with longstanding history of morbid obesity completed workup for laparoscopic sleeve gastrectomy presents today for preoperative visit. Patient weight stable since last visit. Patient is scheduled for surgery on April 8 and had PST this AM. She started modified liquid diet yesterday and stopped glimepiride and invokana as directed by endocrinologist. She has severe RADHA and is using AutoPAP nightly. She has history of mild motion sickness and denies problems with urinary retention.

## 2019-03-26 NOTE — PHYSICAL EXAM
[Obese, well nourished, in no acute distress] : obese, well nourished, in no acute distress [Normal] : affect appropriate [de-identified] : obese, soft, nontender, no evidence of hernia.

## 2019-03-26 NOTE — ASSESSMENT
[FreeTextEntry1] : 58 year old female with longstanding history of obesity completed workup for laparoscopic sleeve gastrectomy and is scheduled for laparoscopic sleeve gastrectomy on April 8. Patient was encouraged to lose weight prior to surgery. Patient will be on preoperative modified liquid diet.  Nutrition and exercise guidelines were reviewed with the patient. Patient will attend preoperative education class. Procedure risks and benefits were again discussed with patient. All questions were answered.\par \par Surgery on April 8\par Continue two-week preoperative modified liquid diet\par Medical clearance\par Preoperative education class\par Call if any questions or concerns.

## 2019-03-26 NOTE — H&P PST ADULT - ABILITY TO HEAR (WITH HEARING AID OR HEARING APPLIANCE IF NORMALLY USED):
Patient informed of results. Verbalized understanding. No further questions or concerns. Adequate: hears normal conversation without difficulty

## 2019-03-26 NOTE — H&P PST ADULT - NSICDXPASTSURGICALHX_GEN_ALL_CORE_FT
PAST SURGICAL HISTORY:  History of  section     Status post cataract extraction left eye done in 2017  right cataract done May 2017

## 2019-03-26 NOTE — H&P PST ADULT - HISTORY OF PRESENT ILLNESS
57 yo female presents with long history of numerous attempts at weight loss. 59 yo female presents with long history of numerous attempts at weight loss.  Patient desires to lose weight in order to better control her diabetes and hypertension.

## 2019-03-26 NOTE — H&P PST ADULT - NSICDXFAMILYHX_GEN_ALL_CORE_FT
FAMILY HISTORY:  FH: senile dementia FAMILY HISTORY:  Father  Still living? Unknown  FH: senile dementia, Age at diagnosis: Age Unknown    Mother  Still living? No  FH: senile dementia, Age at diagnosis: Age Unknown

## 2019-03-27 ENCOUNTER — RX RENEWAL (OUTPATIENT)
Age: 59
End: 2019-03-27

## 2019-04-01 ENCOUNTER — APPOINTMENT (OUTPATIENT)
Dept: INTERNAL MEDICINE | Facility: CLINIC | Age: 59
End: 2019-04-01
Payer: COMMERCIAL

## 2019-04-01 VITALS
RESPIRATION RATE: 14 BRPM | WEIGHT: 232 LBS | HEIGHT: 61 IN | BODY MASS INDEX: 43.8 KG/M2 | OXYGEN SATURATION: 97 % | HEART RATE: 70 BPM | SYSTOLIC BLOOD PRESSURE: 130 MMHG | DIASTOLIC BLOOD PRESSURE: 80 MMHG

## 2019-04-01 PROCEDURE — 99214 OFFICE O/P EST MOD 30 MIN: CPT

## 2019-04-01 NOTE — ASSESSMENT
[Patient Optimized for Surgery] : Patient optimized for surgery [FreeTextEntry4] : DM\par glucose and A1c were high on preop testing, but now on liquid diet and glu was 100 at home\par meds being managed by endo\par \par HTN\par good control\par \par obesity\par scheduled for gastric sleeve\par \par medically cleared for surgery\par \par

## 2019-04-01 NOTE — HISTORY OF PRESENT ILLNESS
[No Pertinent Cardiac History] : no history of aortic stenosis, atrial fibrillation, coronary artery disease, recent myocardial infarction, or implantable device/pacemaker [No Pertinent Pulmonary History] : no history of asthma, COPD, sleep apnea, or smoking [No Adverse Anesthesia Reaction] : no adverse anesthesia reaction in self or family member [Diabetes] : diabetes [Chronic Anticoagulation] : no chronic anticoagulation [Chronic Kidney Disease] : no chronic kidney disease [FreeTextEntry1] : gastric sleeve [FreeTextEntry2] : 4/8/19 [FreeTextEntry3] : Dr Samuel [FreeTextEntry4] : Pt is here for preop prior to gastric sleeve.\par She has DM and sees endo. Glimepiride and invokana are being stopped and she will see endo 2 weeks after surgery.

## 2019-04-01 NOTE — PHYSICAL EXAM
[No Acute Distress] : no acute distress [Well Nourished] : well nourished [Well Developed] : well developed [Well-Appearing] : well-appearing [Normal Sclera/Conjunctiva] : normal sclera/conjunctiva [PERRL] : pupils equal round and reactive to light [Normal Outer Ear/Nose] : the outer ears and nose were normal in appearance [Normal Oropharynx] : the oropharynx was normal [Normal TMs] : both tympanic membranes were normal [Supple] : supple [No Lymphadenopathy] : no lymphadenopathy [No Respiratory Distress] : no respiratory distress  [Clear to Auscultation] : lungs were clear to auscultation bilaterally [No Accessory Muscle Use] : no accessory muscle use [Normal Rate] : normal rate  [Regular Rhythm] : with a regular rhythm [Normal S1, S2] : normal S1 and S2 [No Murmur] : no murmur heard [No Edema] : there was no peripheral edema [Soft] : abdomen soft [Non Tender] : non-tender [Normal Gait] : normal gait [Coordination Grossly Intact] : coordination grossly intact [Normal Affect] : the affect was normal [Normal Insight/Judgement] : insight and judgment were intact

## 2019-04-04 RX ORDER — ENOXAPARIN SODIUM 100 MG/ML
40 INJECTION SUBCUTANEOUS EVERY 12 HOURS
Qty: 0 | Refills: 0 | Status: DISCONTINUED | OUTPATIENT
Start: 2019-04-08 | End: 2019-04-09

## 2019-04-04 RX ORDER — HYDROMORPHONE HYDROCHLORIDE 2 MG/ML
0.5 INJECTION INTRAMUSCULAR; INTRAVENOUS; SUBCUTANEOUS EVERY 4 HOURS
Qty: 0 | Refills: 0 | Status: DISCONTINUED | OUTPATIENT
Start: 2019-04-08 | End: 2019-04-09

## 2019-04-04 RX ORDER — DEXTROSE 50 % IN WATER 50 %
25 SYRINGE (ML) INTRAVENOUS ONCE
Qty: 0 | Refills: 0 | Status: DISCONTINUED | OUTPATIENT
Start: 2019-04-08 | End: 2019-04-09

## 2019-04-04 RX ORDER — GLUCAGON INJECTION, SOLUTION 0.5 MG/.1ML
1 INJECTION, SOLUTION SUBCUTANEOUS ONCE
Qty: 0 | Refills: 0 | Status: DISCONTINUED | OUTPATIENT
Start: 2019-04-08 | End: 2019-04-09

## 2019-04-04 RX ORDER — SODIUM CHLORIDE 9 MG/ML
1000 INJECTION, SOLUTION INTRAVENOUS
Qty: 0 | Refills: 0 | Status: DISCONTINUED | OUTPATIENT
Start: 2019-04-08 | End: 2019-04-09

## 2019-04-04 RX ORDER — INSULIN LISPRO 100/ML
VIAL (ML) SUBCUTANEOUS AT BEDTIME
Qty: 0 | Refills: 0 | Status: DISCONTINUED | OUTPATIENT
Start: 2019-04-08 | End: 2019-04-09

## 2019-04-04 RX ORDER — DEXTROSE 50 % IN WATER 50 %
15 SYRINGE (ML) INTRAVENOUS ONCE
Qty: 0 | Refills: 0 | Status: DISCONTINUED | OUTPATIENT
Start: 2019-04-08 | End: 2019-04-09

## 2019-04-04 RX ORDER — ONDANSETRON 8 MG/1
4 TABLET, FILM COATED ORAL EVERY 6 HOURS
Qty: 0 | Refills: 0 | Status: DISCONTINUED | OUTPATIENT
Start: 2019-04-08 | End: 2019-04-09

## 2019-04-04 RX ORDER — DEXTROSE 50 % IN WATER 50 %
12.5 SYRINGE (ML) INTRAVENOUS ONCE
Qty: 0 | Refills: 0 | Status: DISCONTINUED | OUTPATIENT
Start: 2019-04-08 | End: 2019-04-09

## 2019-04-04 RX ORDER — PANTOPRAZOLE SODIUM 20 MG/1
40 TABLET, DELAYED RELEASE ORAL DAILY
Qty: 0 | Refills: 0 | Status: DISCONTINUED | OUTPATIENT
Start: 2019-04-08 | End: 2019-04-09

## 2019-04-04 RX ORDER — HYOSCYAMINE SULFATE 0.13 MG
0.12 TABLET ORAL EVERY 6 HOURS
Qty: 0 | Refills: 0 | Status: DISCONTINUED | OUTPATIENT
Start: 2019-04-08 | End: 2019-04-09

## 2019-04-04 RX ORDER — INSULIN LISPRO 100/ML
VIAL (ML) SUBCUTANEOUS
Qty: 0 | Refills: 0 | Status: DISCONTINUED | OUTPATIENT
Start: 2019-04-08 | End: 2019-04-09

## 2019-04-07 ENCOUNTER — TRANSCRIPTION ENCOUNTER (OUTPATIENT)
Age: 59
End: 2019-04-07

## 2019-04-08 ENCOUNTER — RESULT REVIEW (OUTPATIENT)
Age: 59
End: 2019-04-08

## 2019-04-08 ENCOUNTER — INPATIENT (INPATIENT)
Facility: HOSPITAL | Age: 59
LOS: 0 days | Discharge: HOME | DRG: 621 | End: 2019-04-09
Attending: SURGERY | Admitting: SURGERY
Payer: COMMERCIAL

## 2019-04-08 ENCOUNTER — APPOINTMENT (OUTPATIENT)
Dept: BARIATRICS | Facility: HOSPITAL | Age: 59
End: 2019-04-08
Payer: COMMERCIAL

## 2019-04-08 VITALS
RESPIRATION RATE: 11 BRPM | SYSTOLIC BLOOD PRESSURE: 132 MMHG | OXYGEN SATURATION: 97 % | WEIGHT: 224.21 LBS | HEIGHT: 61 IN | HEART RATE: 74 BPM | TEMPERATURE: 98 F | DIASTOLIC BLOOD PRESSURE: 63 MMHG

## 2019-04-08 DIAGNOSIS — Z98.49 CATARACT EXTRACTION STATUS, UNSPECIFIED EYE: Chronic | ICD-10-CM

## 2019-04-08 DIAGNOSIS — E66.01 MORBID (SEVERE) OBESITY DUE TO EXCESS CALORIES: ICD-10-CM

## 2019-04-08 DIAGNOSIS — Z98.891 HISTORY OF UTERINE SCAR FROM PREVIOUS SURGERY: Chronic | ICD-10-CM

## 2019-04-08 DIAGNOSIS — E11.9 TYPE 2 DIABETES MELLITUS WITHOUT COMPLICATIONS: ICD-10-CM

## 2019-04-08 DIAGNOSIS — G47.30 SLEEP APNEA, UNSPECIFIED: ICD-10-CM

## 2019-04-08 DIAGNOSIS — E78.00 PURE HYPERCHOLESTEROLEMIA, UNSPECIFIED: ICD-10-CM

## 2019-04-08 DIAGNOSIS — I10 ESSENTIAL (PRIMARY) HYPERTENSION: ICD-10-CM

## 2019-04-08 LAB
ABO RH CONFIRMATION: SIGNIFICANT CHANGE UP
GLUCOSE BLDC GLUCOMTR-MCNC: 117 MG/DL — HIGH (ref 70–99)
GLUCOSE BLDC GLUCOMTR-MCNC: 140 MG/DL — HIGH (ref 70–99)
GLUCOSE BLDC GLUCOMTR-MCNC: 154 MG/DL — HIGH (ref 70–99)

## 2019-04-08 PROCEDURE — 83036 HEMOGLOBIN GLYCOSYLATED A1C: CPT

## 2019-04-08 PROCEDURE — G0463: CPT

## 2019-04-08 PROCEDURE — 86900 BLOOD TYPING SEROLOGIC ABO: CPT

## 2019-04-08 PROCEDURE — 88305 TISSUE EXAM BY PATHOLOGIST: CPT | Mod: 26

## 2019-04-08 PROCEDURE — 86901 BLOOD TYPING SEROLOGIC RH(D): CPT

## 2019-04-08 PROCEDURE — 86850 RBC ANTIBODY SCREEN: CPT

## 2019-04-08 PROCEDURE — 36415 COLL VENOUS BLD VENIPUNCTURE: CPT

## 2019-04-08 PROCEDURE — 43775 LAP SLEEVE GASTRECTOMY: CPT | Mod: AS

## 2019-04-08 PROCEDURE — 80048 BASIC METABOLIC PNL TOTAL CA: CPT

## 2019-04-08 PROCEDURE — 43775 LAP SLEEVE GASTRECTOMY: CPT

## 2019-04-08 PROCEDURE — 85027 COMPLETE CBC AUTOMATED: CPT

## 2019-04-08 RX ORDER — CHLORHEXIDINE GLUCONATE 213 G/1000ML
1 SOLUTION TOPICAL ONCE
Qty: 0 | Refills: 0 | Status: COMPLETED | OUTPATIENT
Start: 2019-04-08 | End: 2019-04-08

## 2019-04-08 RX ORDER — INSULIN GLARGINE 100 [IU]/ML
32 INJECTION, SOLUTION SUBCUTANEOUS
Qty: 0 | Refills: 0 | COMMUNITY

## 2019-04-08 RX ORDER — SODIUM CHLORIDE 9 MG/ML
1000 INJECTION, SOLUTION INTRAVENOUS ONCE
Qty: 0 | Refills: 0 | Status: COMPLETED | OUTPATIENT
Start: 2019-04-08 | End: 2019-04-08

## 2019-04-08 RX ORDER — ENOXAPARIN SODIUM 100 MG/ML
40 INJECTION SUBCUTANEOUS ONCE
Qty: 0 | Refills: 0 | Status: COMPLETED | OUTPATIENT
Start: 2019-04-08 | End: 2019-04-08

## 2019-04-08 RX ORDER — CEFAZOLIN SODIUM 1 G
2000 VIAL (EA) INJECTION ONCE
Qty: 0 | Refills: 0 | Status: COMPLETED | OUTPATIENT
Start: 2019-04-08 | End: 2019-04-08

## 2019-04-08 RX ORDER — LIRAGLUTIDE 6 MG/ML
3 INJECTION SUBCUTANEOUS
Qty: 0 | Refills: 0 | COMMUNITY

## 2019-04-08 RX ORDER — METOPROLOL TARTRATE 50 MG
5 TABLET ORAL EVERY 6 HOURS
Qty: 0 | Refills: 0 | Status: DISCONTINUED | OUTPATIENT
Start: 2019-04-08 | End: 2019-04-09

## 2019-04-08 RX ORDER — ACETAMINOPHEN 500 MG
1000 TABLET ORAL ONCE
Qty: 0 | Refills: 0 | Status: COMPLETED | OUTPATIENT
Start: 2019-04-08 | End: 2019-04-08

## 2019-04-08 RX ORDER — IBUPROFEN 200 MG
800 TABLET ORAL EVERY 6 HOURS
Qty: 0 | Refills: 0 | Status: DISCONTINUED | OUTPATIENT
Start: 2019-04-08 | End: 2019-04-09

## 2019-04-08 RX ORDER — ONDANSETRON 8 MG/1
4 TABLET, FILM COATED ORAL ONCE
Qty: 0 | Refills: 0 | Status: DISCONTINUED | OUTPATIENT
Start: 2019-04-08 | End: 2019-04-08

## 2019-04-08 RX ORDER — ACETAMINOPHEN 500 MG
1000 TABLET ORAL EVERY 6 HOURS
Qty: 0 | Refills: 0 | Status: COMPLETED | OUTPATIENT
Start: 2019-04-08 | End: 2019-04-09

## 2019-04-08 RX ORDER — HYDROMORPHONE HYDROCHLORIDE 2 MG/ML
0.5 INJECTION INTRAMUSCULAR; INTRAVENOUS; SUBCUTANEOUS
Qty: 0 | Refills: 0 | Status: DISCONTINUED | OUTPATIENT
Start: 2019-04-08 | End: 2019-04-08

## 2019-04-08 RX ORDER — APREPITANT 80 MG/1
40 CAPSULE ORAL ONCE
Qty: 0 | Refills: 0 | Status: COMPLETED | OUTPATIENT
Start: 2019-04-08 | End: 2019-04-08

## 2019-04-08 RX ORDER — ACETAMINOPHEN 500 MG
1000 TABLET ORAL EVERY 6 HOURS
Qty: 0 | Refills: 0 | Status: DISCONTINUED | OUTPATIENT
Start: 2019-04-09 | End: 2019-04-09

## 2019-04-08 RX ORDER — SODIUM CHLORIDE 9 MG/ML
1000 INJECTION, SOLUTION INTRAVENOUS
Qty: 0 | Refills: 0 | Status: DISCONTINUED | OUTPATIENT
Start: 2019-04-08 | End: 2019-04-08

## 2019-04-08 RX ADMIN — SODIUM CHLORIDE 100 MILLILITER(S): 9 INJECTION, SOLUTION INTRAVENOUS at 13:50

## 2019-04-08 RX ADMIN — CHLORHEXIDINE GLUCONATE 1 APPLICATION(S): 213 SOLUTION TOPICAL at 09:39

## 2019-04-08 RX ADMIN — ENOXAPARIN SODIUM 40 MILLIGRAM(S): 100 INJECTION SUBCUTANEOUS at 18:27

## 2019-04-08 RX ADMIN — SODIUM CHLORIDE 500 MILLILITER(S): 9 INJECTION, SOLUTION INTRAVENOUS at 23:00

## 2019-04-08 RX ADMIN — Medication 400 MILLIGRAM(S): at 18:33

## 2019-04-08 RX ADMIN — SODIUM CHLORIDE 150 MILLILITER(S): 9 INJECTION, SOLUTION INTRAVENOUS at 18:34

## 2019-04-08 RX ADMIN — APREPITANT 40 MILLIGRAM(S): 80 CAPSULE ORAL at 09:39

## 2019-04-08 RX ADMIN — Medication 1: at 18:26

## 2019-04-08 RX ADMIN — ENOXAPARIN SODIUM 40 MILLIGRAM(S): 100 INJECTION SUBCUTANEOUS at 09:39

## 2019-04-08 RX ADMIN — Medication 1000 MILLIGRAM(S): at 19:15

## 2019-04-08 RX ADMIN — ONDANSETRON 4 MILLIGRAM(S): 8 TABLET, FILM COATED ORAL at 18:27

## 2019-04-08 NOTE — CONSULT NOTE ADULT - SUBJECTIVE AND OBJECTIVE BOX
PULMONARY/CRITICAL CARE        Patient is a 58y old  Female who presents with a chief complaint of gastric sleeve  BRIEF HOSPITAL COURSE: ***    Events last 24 hours: ***    PAST MEDICAL & SURGICAL HISTORY:  Obesity, morbid, BMI 40.0-49.9  Sleep apnea: CPAP for 1 1/2 months  nasal mask/ autopap  Gastritis  Hypertension  Diabetic neuropathy: feet  Type 2 diabetes mellitus:   Hypercholesterolemia  Status post cataract extraction: left eye done in 2017  right cataract done May 2017  History of  section:     Allergies    Trulicity Pen (Pruritus)    Intolerances      FAMILY HISTORY:  FH: senile dementia (Father, Mother)      Review of Systems:  CONSTITUTIONAL: No fever, chills, or fatigue  EYES: No eye pain, visual disturbances, or discharge  ENMT:  No difficulty hearing, tinnitus, vertigo; No sinus or throat pain  NECK: No pain or stiffness  RESPIRATORY: No cough, wheezing, chills or hemoptysis; No shortness of breath  CARDIOVASCULAR: No chest pain, palpitations, dizziness, or leg swelling  GASTROINTESTINAL: mild abdominal  pain. No nausea, vomiting, or hematemesis; No diarrhea or constipation. No melena or hematochezia.  GENITOURINARY: No dysuria, frequency, hematuria, or incontinence  NEUROLOGICAL: No headaches, memory loss, loss of strength, numbness, or tremors  SKIN: No itching, burning, rashes, or lesions   MUSCULOSKELETAL: No joint pain or swelling; No muscle, back, or extremity pain  PSYCHIATRIC: No depression, anxiety, mood swings, or difficulty sleeping      Medications:    metoprolol tartrate Injectable 5 milliGRAM(s) IV Push every 6 hours      acetaminophen  IVPB .. 1000 milliGRAM(s) IV Intermittent every 6 hours  HYDROmorphone  Injectable 0.5 milliGRAM(s) IV Push every 4 hours PRN  ibuprofen IVPB .. 800 milliGRAM(s) IV Intermittent every 6 hours PRN  ondansetron Injectable 4 milliGRAM(s) IV Push every 6 hours      enoxaparin Injectable 40 milliGRAM(s) SubCutaneous every 12 hours    hyoscyamine SL 0.125 milliGRAM(s) SubLingual every 6 hours PRN  pantoprazole  Injectable 40 milliGRAM(s) IV Push daily      dextrose 40% Gel 15 Gram(s) Oral once PRN  dextrose 50% Injectable 12.5 Gram(s) IV Push once  dextrose 50% Injectable 25 Gram(s) IV Push once  dextrose 50% Injectable 25 Gram(s) IV Push once  glucagon  Injectable 1 milliGRAM(s) IntraMuscular once PRN  insulin lispro (HumaLOG) corrective regimen sliding scale   SubCutaneous three times a day before meals  insulin lispro (HumaLOG) corrective regimen sliding scale   SubCutaneous at bedtime    dextrose 5%. 1000 milliLiter(s) IV Continuous <Continuous>  lactated ringers. 1000 milliLiter(s) IV Continuous <Continuous>  lactated ringers. 1000 milliLiter(s) IV Continuous <Continuous>                ICU Vital Signs Last 24 Hrs  T(C): 36.4 (2019 18:40), Max: 36.8 (2019 09:26)  T(F): 97.6 (2019 18:40), Max: 98.3 (2019 09:26)  HR: 73 (2019 18:40) (68 - 74)  BP: 137/76 (2019 18:40) (118/57 - 140/60)  BP(mean): --  ABP: --  ABP(mean): --  RR: 18 (2019 18:40) (11 - 18)  SpO2: 95% (2019 18:40) (95% - 100%)    Vital Signs Last 24 Hrs  T(C): 36.4 (2019 18:40), Max: 36.8 (2019 09:26)  T(F): 97.6 (2019 18:40), Max: 98.3 (2019 09:26)  HR: 73 (2019 18:40) (68 - 74)  BP: 137/76 (2019 18:40) (118/57 - 140/60)  BP(mean): --  RR: 18 (2019 18:40) (11 - 18)  SpO2: 95% (2019 18:40) (95% - 100%)        I&O's Detail    2019 07:01  -  2019 19:15  --------------------------------------------------------  IN:    lactated ringers.: 2000 mL  Total IN: 2000 mL    OUT:    Estimated Blood Loss: 20 mL    Voided: 350 mL  Total OUT: 370 mL    Total NET: 1630 mL            LABS:                CAPILLARY BLOOD GLUCOSE      POCT Blood Glucose.: 154 mg/dL (2019 17:43)        CULTURES:      Physical Examination:    General: No acute distress.      HEENT: Pupils equal, reactive to light.  Symmetric.    PULM: Clear to auscultation bilaterally, no significant sputum production    CVS: Regular rate and rhythm, no murmurs, rubs, or gallops    ABD: Soft, nondistended, mildly tender, decreased bowel sounds, no masses    EXT: No edema, nontender calves    SKIN: Warm and well perfused, no rashes noted.    NEURO: Alert, oriented, interactive, nonfocal    RADIOLOGY: ***    CRITICAL CARE TIME SPENT: ***

## 2019-04-09 ENCOUNTER — TRANSCRIPTION ENCOUNTER (OUTPATIENT)
Age: 59
End: 2019-04-09

## 2019-04-09 VITALS
TEMPERATURE: 98 F | HEART RATE: 61 BPM | DIASTOLIC BLOOD PRESSURE: 73 MMHG | SYSTOLIC BLOOD PRESSURE: 113 MMHG | RESPIRATION RATE: 16 BRPM | OXYGEN SATURATION: 92 %

## 2019-04-09 DIAGNOSIS — Z98.84 BARIATRIC SURGERY STATUS: ICD-10-CM

## 2019-04-09 LAB
ANION GAP SERPL CALC-SCNC: 9 MMOL/L — SIGNIFICANT CHANGE UP (ref 5–17)
BUN SERPL-MCNC: 20 MG/DL — SIGNIFICANT CHANGE UP (ref 7–23)
CALCIUM SERPL-MCNC: 9 MG/DL — SIGNIFICANT CHANGE UP (ref 8.4–10.5)
CHLORIDE SERPL-SCNC: 104 MMOL/L — SIGNIFICANT CHANGE UP (ref 96–108)
CO2 SERPL-SCNC: 25 MMOL/L — SIGNIFICANT CHANGE UP (ref 22–31)
CREAT SERPL-MCNC: 0.8 MG/DL — SIGNIFICANT CHANGE UP (ref 0.5–1.3)
GLUCOSE BLDC GLUCOMTR-MCNC: 107 MG/DL — HIGH (ref 70–99)
GLUCOSE BLDC GLUCOMTR-MCNC: 111 MG/DL — HIGH (ref 70–99)
GLUCOSE BLDC GLUCOMTR-MCNC: 113 MG/DL — HIGH (ref 70–99)
GLUCOSE SERPL-MCNC: 126 MG/DL — HIGH (ref 70–99)
HCT VFR BLD CALC: 37.5 % — SIGNIFICANT CHANGE UP (ref 34.5–45)
HGB BLD-MCNC: 12.4 G/DL — SIGNIFICANT CHANGE UP (ref 11.5–15.5)
MCHC RBC-ENTMCNC: 28.1 PG — SIGNIFICANT CHANGE UP (ref 27–34)
MCHC RBC-ENTMCNC: 33.1 GM/DL — SIGNIFICANT CHANGE UP (ref 32–36)
MCV RBC AUTO: 84.8 FL — SIGNIFICANT CHANGE UP (ref 80–100)
NRBC # BLD: 0 /100 WBCS — SIGNIFICANT CHANGE UP (ref 0–0)
PLATELET # BLD AUTO: 299 K/UL — SIGNIFICANT CHANGE UP (ref 150–400)
POTASSIUM SERPL-MCNC: 4.1 MMOL/L — SIGNIFICANT CHANGE UP (ref 3.5–5.3)
POTASSIUM SERPL-SCNC: 4.1 MMOL/L — SIGNIFICANT CHANGE UP (ref 3.5–5.3)
RBC # BLD: 4.42 M/UL — SIGNIFICANT CHANGE UP (ref 3.8–5.2)
RBC # FLD: 13.2 % — SIGNIFICANT CHANGE UP (ref 10.3–14.5)
SODIUM SERPL-SCNC: 138 MMOL/L — SIGNIFICANT CHANGE UP (ref 135–145)
WBC # BLD: 10.08 K/UL — SIGNIFICANT CHANGE UP (ref 3.8–10.5)
WBC # FLD AUTO: 10.08 K/UL — SIGNIFICANT CHANGE UP (ref 3.8–10.5)

## 2019-04-09 PROCEDURE — 94660 CPAP INITIATION&MGMT: CPT

## 2019-04-09 PROCEDURE — 82962 GLUCOSE BLOOD TEST: CPT

## 2019-04-09 PROCEDURE — 12345: CPT | Mod: NC

## 2019-04-09 PROCEDURE — 85027 COMPLETE CBC AUTOMATED: CPT

## 2019-04-09 PROCEDURE — 88305 TISSUE EXAM BY PATHOLOGIST: CPT

## 2019-04-09 PROCEDURE — 36415 COLL VENOUS BLD VENIPUNCTURE: CPT

## 2019-04-09 PROCEDURE — 80048 BASIC METABOLIC PNL TOTAL CA: CPT

## 2019-04-09 PROCEDURE — C1889: CPT

## 2019-04-09 RX ORDER — GABAPENTIN 400 MG/1
1 CAPSULE ORAL
Qty: 0 | Refills: 0 | COMMUNITY

## 2019-04-09 RX ORDER — GABAPENTIN 400 MG/1
2 CAPSULE ORAL
Qty: 180 | Refills: 0 | OUTPATIENT
Start: 2019-04-09

## 2019-04-09 RX ORDER — RAMIPRIL 5 MG
1 CAPSULE ORAL
Qty: 0 | Refills: 0 | COMMUNITY

## 2019-04-09 RX ORDER — HYDRALAZINE/HYDROCHLOROTHIAZID 50 MG-50MG
0 CAPSULE ORAL
Qty: 0 | Refills: 0 | COMMUNITY

## 2019-04-09 RX ORDER — SODIUM CHLORIDE 9 MG/ML
1000 INJECTION, SOLUTION INTRAVENOUS ONCE
Qty: 0 | Refills: 0 | Status: COMPLETED | OUTPATIENT
Start: 2019-04-09 | End: 2019-04-09

## 2019-04-09 RX ORDER — LIRAGLUTIDE 6 MG/ML
3 INJECTION SUBCUTANEOUS
Qty: 0 | Refills: 0 | COMMUNITY

## 2019-04-09 RX ORDER — GABAPENTIN 400 MG/1
800 CAPSULE ORAL THREE TIMES A DAY
Qty: 0 | Refills: 0 | Status: DISCONTINUED | OUTPATIENT
Start: 2019-04-09 | End: 2019-04-09

## 2019-04-09 RX ORDER — METFORMIN HYDROCHLORIDE 850 MG/1
1 TABLET ORAL
Qty: 0 | Refills: 0 | COMMUNITY

## 2019-04-09 RX ORDER — ONDANSETRON 8 MG/1
1 TABLET, FILM COATED ORAL
Qty: 0 | Refills: 0 | COMMUNITY

## 2019-04-09 RX ORDER — OMEPRAZOLE 10 MG/1
1 CAPSULE, DELAYED RELEASE ORAL
Qty: 0 | Refills: 0 | COMMUNITY

## 2019-04-09 RX ORDER — ATORVASTATIN CALCIUM 80 MG/1
1 TABLET, FILM COATED ORAL
Qty: 0 | Refills: 0 | COMMUNITY

## 2019-04-09 RX ORDER — CHOLECALCIFEROL (VITAMIN D3) 125 MCG
1 CAPSULE ORAL
Qty: 0 | Refills: 0 | COMMUNITY

## 2019-04-09 RX ADMIN — GABAPENTIN 800 MILLIGRAM(S): 400 CAPSULE ORAL at 14:01

## 2019-04-09 RX ADMIN — ONDANSETRON 4 MILLIGRAM(S): 8 TABLET, FILM COATED ORAL at 18:00

## 2019-04-09 RX ADMIN — Medication 400 MILLIGRAM(S): at 00:02

## 2019-04-09 RX ADMIN — ENOXAPARIN SODIUM 40 MILLIGRAM(S): 100 INJECTION SUBCUTANEOUS at 17:59

## 2019-04-09 RX ADMIN — ONDANSETRON 4 MILLIGRAM(S): 8 TABLET, FILM COATED ORAL at 11:23

## 2019-04-09 RX ADMIN — Medication 1000 MILLIGRAM(S): at 00:29

## 2019-04-09 RX ADMIN — Medication 400 MILLIGRAM(S): at 06:17

## 2019-04-09 RX ADMIN — GABAPENTIN 800 MILLIGRAM(S): 400 CAPSULE ORAL at 09:17

## 2019-04-09 RX ADMIN — SODIUM CHLORIDE 150 MILLILITER(S): 9 INJECTION, SOLUTION INTRAVENOUS at 18:02

## 2019-04-09 RX ADMIN — ONDANSETRON 4 MILLIGRAM(S): 8 TABLET, FILM COATED ORAL at 06:18

## 2019-04-09 RX ADMIN — ONDANSETRON 4 MILLIGRAM(S): 8 TABLET, FILM COATED ORAL at 00:03

## 2019-04-09 RX ADMIN — PANTOPRAZOLE SODIUM 40 MILLIGRAM(S): 20 TABLET, DELAYED RELEASE ORAL at 11:22

## 2019-04-09 RX ADMIN — Medication 5 MILLIGRAM(S): at 08:17

## 2019-04-09 RX ADMIN — SODIUM CHLORIDE 150 MILLILITER(S): 9 INJECTION, SOLUTION INTRAVENOUS at 08:14

## 2019-04-09 RX ADMIN — ENOXAPARIN SODIUM 40 MILLIGRAM(S): 100 INJECTION SUBCUTANEOUS at 06:18

## 2019-04-09 RX ADMIN — Medication 1000 MILLIGRAM(S): at 06:40

## 2019-04-09 RX ADMIN — SODIUM CHLORIDE 500 MILLILITER(S): 9 INJECTION, SOLUTION INTRAVENOUS at 09:18

## 2019-04-09 NOTE — PROGRESS NOTE ADULT - PROBLEM SELECTOR PLAN 1
Cont GI / DVT prophylaxis.   Cont  OOB / ambulation on floor / incentive spirometry.  Cont bariatric clear diet as tolerated  Dietician consult.   Discussed and reviewed home meds and pain medication with patient. Discussed medication that requires crushing.  Plan to begin protein shakes at home.  Possibly discharged later today or tomorrow.

## 2019-04-09 NOTE — DISCHARGE NOTE PROVIDER - HOSPITAL COURSE
58 year old female with history of morbid obesity s/p laparoscopic sleeve gastrectomy with intraoperative EGD. Post operatively patient did well, good urine output  and ambulating well on floor. Patient tolerated advancement of diet to bariatric clears.  Nutritional guidelines were reviewed with the nutritionist. Patient felt ready for discharge to home. Pt instructed to drink small frequent amounts, start protein drinks and follow dietary guidelines.

## 2019-04-09 NOTE — DISCHARGE NOTE PROVIDER - REASON FOR ADMISSION
58 year old female with PMHx of morbid obesity admitted to [facility] for scheduled laparoscopic sleeve gastrectomy with hiatal hernia repair.

## 2019-04-09 NOTE — DISCHARGE NOTE NURSING/CASE MANAGEMENT/SOCIAL WORK - NSDCDPATPORTLINK_GEN_ALL_CORE
You can access the [facility] Patient Portal, offered by [facility], by registering with the following website: [email or web address]

## 2019-04-09 NOTE — CHART NOTE - NSCHARTNOTEFT_GEN_A_CORE
Called overnight and this morning regarding metoprolol IV dose.  Since patient is NPO and her BP has ranged between 122-138/60-78 with HR between 63-75 overnight (currently 63 at 0540), held both doses.  However, if patient's BP and HR increases later this morning would restart metoprolol. Called overnight and this morning regarding metoprolol IV dose.  Since patient is NPO and her BP has ranged between 122-138/60-78 with HR between 63-75 overnight (currently 63 at 0540), held both doses (this would be the first time she would be on metoprolol as per med rec).  However, if patient's BP and HR increases later this morning would restart metoprolol.

## 2019-04-09 NOTE — PROGRESS NOTE ADULT - SUBJECTIVE AND OBJECTIVE BOX
no significant events overnight reported   pt comfortable   passing gas   VSS abd soft, obese, mild tender w/o rebound          cv s1s2         chest air entry  labs reviewed    a/p Post op day 1 EGD assisted lap sleeve gastrectomy   no extravasation or bleeding on endoscopy       lumen patent   cont w/ acid suppression    zofran prn   ambulation encouraged   bariatric clears- to be advanced per surgery  d/c plan

## 2019-04-09 NOTE — PROGRESS NOTE ADULT - SUBJECTIVE AND OBJECTIVE BOX
Pre-Op Dx: Morbid obesity  Procedure: laparoscopic sleeve gastrectomy with intraoperative EGD  Surgeon: Ángel    HPI:   58 year old Female laparoscopic sleeve gastrectomy with intraoperative EGD POD # 1. Patient is ambulating on the floor and utilizing incentive spirometry. She is tolerating clear liquid diet and urinating well. Minimal incisional discomfort. Denies any nausea or vomiting. Pt seen and examined at the bedside.     VITALS:  Vital Signs Last 24 Hrs  T(C): 36.7 (09 Apr 2019 07:46), Max: 36.8 (08 Apr 2019 09:26)  T(F): 98 (09 Apr 2019 07:46), Max: 98.3 (08 Apr 2019 09:26)  HR: 59 (09 Apr 2019 07:46) (59 - 75)  BP: 133/79 (09 Apr 2019 07:46) (118/57 - 141/66)  BP(mean): --  RR: 18 (09 Apr 2019 07:46) (11 - 18)  SpO2: 98% (09 Apr 2019 07:46) (92% - 100%)    04-08 @ 07:01  -  04-09 @ 07:00  --------------------------------------------------------  IN: 2650 mL / OUT: 870 mL / NET: 1780 mL      LABS:                        12.4   10.08 )-----------( 299      ( 09 Apr 2019 08:20 )             37.5     04-09    138  |  104  |  20  ----------------------------<  126<H>  4.1   |  25  |  0.80    Ca    9.0      09 Apr 2019 08:20        CAPILLARY BLOOD GLUCOSE      POCT Blood Glucose.: 113 mg/dL (09 Apr 2019 05:32)  POCT Blood Glucose.: 140 mg/dL (08 Apr 2019 22:00)  POCT Blood Glucose.: 154 mg/dL (08 Apr 2019 17:43)  POCT Blood Glucose.: 117 mg/dL (08 Apr 2019 09:22)        Physical Exam:  General: A/O x 3, NAD, resting comfortably in bed  Abdominal: soft, ND, nontender to palpation, incisions C/D/I  Extremities: no edema, no calf tenderness B/L

## 2019-04-09 NOTE — PHARMACOTHERAPY INTERVENTION NOTE - COMMENTS
Post Op bariatric patient on discharge medications: Percocet / Acetaminophen Liquid  for pain management, Omeprazole for gastro-intestinal protection, Ondansetron for prevention of nausea and vomiting and home medications (Atorvastatin for hyperlipidemia, Gabapentin for neuropathy, ramipril for hypertension, Metformin and Toujeo for management of diabetes):  Educated patient on what medications are use for, how to take medication, what to do if dose is missed, major side effects, and medications to avoid while recovering from surgery.

## 2019-04-09 NOTE — PROGRESS NOTE ADULT - SUBJECTIVE AND OBJECTIVE BOX
PULMONARY/CRITICAL CARE      INTERVAL HPI/OVERNIGHT EVENTS:    58y FemaleHPI:  Pt has less pain. Ambulated. Wore cpap hs.      PAST MEDICAL & SURGICAL HISTORY:  Obesity, morbid, BMI 40.0-49.9  Sleep apnea: CPAP for 1 1/2 months  nasal mask  Gastritis  Hypertension  Diabetic neuropathy: feet  Type 2 diabetes mellitus:   Hypercholesterolemia  Status post cataract extraction: left eye done in 2017  right cataract done May 2017  History of  section:         ICU Vital Signs Last 24 Hrs  T(C): 36.4 (2019 03:16), Max: 36.8 (2019 09:26)  T(F): 97.6 (2019 03:16), Max: 98.3 (2019 09:26)  HR: 63 (2019 05:30) (63 - 75)  BP: 134/67 (2019 05:30) (118/57 - 141/66)  BP(mean): --  ABP: --  ABP(mean): --  RR: 17 (2019 03:16) (11 - 18)  SpO2: 95% (2019 03:16) (92% - 100%)    Qtts:     I&O's Summary    2019 07:01  -  2019 07:00  --------------------------------------------------------  IN: 2650 mL / OUT: 870 mL / NET: 1780 mL            REVIEW OF SYSTEMS:    CONSTITUTIONAL: No fever, weight loss, or fatigue  RESPIRATORY: No cough, wheezing, chills or hemoptysis; No shortness of breath  CARDIOVASCULAR: No chest pain, palpitations, dizziness, or leg swelling  GASTROINTESTINAL  mild  abdominal pain. No nausea, vomiting, or hematemesis; No diarrhea or constipation. No melena or hematochezia.      PHYSICAL EXAM:    GENERAL: NAD, well-groomed, well-developed, NAD  HEAD:  Atraumatic, Normocephalic  EYES: EOMI, PERRLA, conjunctiva and sclera clear  ENMT: No tonsillar erythema, exudates, or enlargement; Moist mucous membranes, Good dentition, No lesions  NECK: Supple, No JVD, Normal thyroid  NERVOUS SYSTEM:  Alert & Oriented X3, Good concentration; Motor Strength 5/5 B/L upper and lower extremities  CHEST/LUNG: Clear to percussion bilaterally; No rales, rhonchi, wheezing, or rubs  HEART: Regular rate and rhythm; No murmurs, rubs, or gallops  ABDOMEN: Soft, mildly tender, Nondistended; Bowel sounds decreased  EXTREMITIES:  2+ Peripheral Pulses, No clubbing, cyanosis, or edema  LYMPH: No lymphadenopathy noted  SKIN: No rashes or lesions        LABS:                vanco through     RADIOLOGY & ADDITIONAL STUDIES:      CRITICAL CARE TIME SPENT:

## 2019-04-09 NOTE — DISCHARGE NOTE PROVIDER - INSTRUCTIONS
Instructed to ambulate and use incentive spirometry frequently. Ice packs to abdominal wall and shoulders as needed for discomfort. Cont bariatric diet and follow nutritional guidelines as instructed. Pain meds as needed. Pt instructed  to follow up with Dr. Neal in 1 week.

## 2019-04-09 NOTE — DISCHARGE NOTE PROVIDER - NSDCCPTREATMENT_GEN_ALL_CORE_FT
PRINCIPAL PROCEDURE  Procedure: Laparoscopic sleeve gastrectomy with esophagogastroduodenoscopy (EDG)  Findings and Treatment: Tolerated procedure well.

## 2019-04-09 NOTE — DISCHARGE NOTE PROVIDER - NSDCCPCAREPLAN_GEN_ALL_CORE_FT
PRINCIPAL DISCHARGE DIAGNOSIS  Diagnosis: Morbid obesity  Assessment and Plan of Treatment: Instructed to ambulate and use incentive spirometry frequently. Ice packs to abdominal wall and shoulders as needed for discomfort. Cont bariatric diet and follow nutritional guidelines as instructed. Pain meds as needed. Pt instructed  to follow up with Dr. Neal in 1 week.      SECONDARY DISCHARGE DIAGNOSES  Diagnosis: S/P laparoscopic sleeve gastrectomy  Assessment and Plan of Treatment: Instructed to ambulate and use incentive spirometry frequently. Ice packs to abdominal wall and shoulders as needed for discomfort. Cont bariatric diet and follow nutritional guidelines as instructed. Pain meds as needed. Pt instructed  to follow up with Dr. Neal in 1 week.

## 2019-04-09 NOTE — PROGRESS NOTE ADULT - ASSESSMENT
58 year old Female POD #1 laparoscopic sleeve gastrectomy with intraoperative EGD is doing well and hemodynamically stable.

## 2019-04-09 NOTE — DISCHARGE NOTE PROVIDER - NSDCACTIVITY_GEN_ALL_CORE
Walking - Outdoors allowed/Walking - Indoors allowed/No heavy lifting/straining/Do not make important decisions/Stairs allowed/Do not drive or operate machinery/Showering allowed

## 2019-04-10 ENCOUNTER — MESSAGE (OUTPATIENT)
Age: 59
End: 2019-04-10

## 2019-04-10 LAB — SURGICAL PATHOLOGY STUDY: SIGNIFICANT CHANGE UP

## 2019-04-12 NOTE — PATIENT PROFILE ADULT - HAVE YOU EXPERIENCED A TRAUMATIC EVENT?
c/o SOB x 4 days and hoarse voice x this am, reports was dx'd with pneumonia in another hospital on Levaquin , meloxicam with no relief, breathing is even and unlabored, lung sounds clear B/l denies c/p. noted dry non productive cough in triage.
no

## 2019-04-16 ENCOUNTER — APPOINTMENT (OUTPATIENT)
Dept: BARIATRICS | Facility: CLINIC | Age: 59
End: 2019-04-16
Payer: COMMERCIAL

## 2019-04-16 VITALS
SYSTOLIC BLOOD PRESSURE: 130 MMHG | OXYGEN SATURATION: 96 % | HEART RATE: 69 BPM | WEIGHT: 223.54 LBS | DIASTOLIC BLOOD PRESSURE: 70 MMHG | HEIGHT: 61 IN | BODY MASS INDEX: 42.21 KG/M2

## 2019-04-16 PROCEDURE — 99024 POSTOP FOLLOW-UP VISIT: CPT

## 2019-04-16 RX ORDER — OXYCODONE AND ACETAMINOPHEN 5; 325 MG/1; MG/1
5-325 TABLET ORAL EVERY 6 HOURS
Qty: 12 | Refills: 0 | Status: COMPLETED | COMMUNITY
Start: 2019-04-02 | End: 2019-04-16

## 2019-04-16 RX ORDER — ONDANSETRON 4 MG/1
4 TABLET, ORALLY DISINTEGRATING ORAL 4 TIMES DAILY
Qty: 15 | Refills: 0 | Status: COMPLETED | COMMUNITY
Start: 2019-04-02 | End: 2019-04-16

## 2019-04-16 RX ORDER — LIRAGLUTIDE 6 MG/ML
18 INJECTION, SOLUTION SUBCUTANEOUS
Refills: 0 | Status: COMPLETED | COMMUNITY
End: 2019-04-16

## 2019-04-18 NOTE — ASSESSMENT
[FreeTextEntry1] : 58 year old female one week s/p laparoscopic sleeve gastrectomy and hiatal hernia repair presents for first post operative visit. She is doing well and losing weight. Incisions are healing appropriately. The patient was encouraged to continue a liquid low fat, low carbohydrate and high protein diet for another week and then progress to soft/pureed foods as tolerated.  Patient was advised to increase ambulation and not to do any heavy lifting until 6 weeks post op. Given literature on constipation remedies.\par \par Nutrition and exercise counseling provided.\par Start Vitamins and continue omeprazole\par Post op nutrition classes\par Follow up in 3 weeks\par Call with any questions or concerns\par

## 2019-04-18 NOTE — REVIEW OF SYSTEMS
[Recent Change In Weight] : ~T recent weight change [Constipation] : constipation [Negative] : Psychiatric [Chills] : no chills [Night Sweats] : no night sweats [Fatigue] : no fatigue [Vomiting] : no vomiting [Abdominal Pain] : no abdominal pain [Reflux/Heartburn] : no reflux/ heartburn [Hernia] : no hernia [Diarrhea] : no diarrhea

## 2019-04-18 NOTE — HISTORY OF PRESENT ILLNESS
[Procedure: ___] : Procedure performed: [unfilled]  [Date of Surgery: ___] : Date of Surgery:   [unfilled] [Surgeon Name:   ___] : Surgeon Name: Dr. VALVERDE [Pre-Op Weight ___] : Pre-op weight was [unfilled] lbs [de-identified] : 58 year old female one week s/p laparoscopic sleeve gastrectomy and hiatal hernia repair presents today for first post operative visit. Patient lost 12 lbs since last visit. She is consuming the appropriate amount of protein and fluids daily. She takes omeprazole daily. She didn’t have bowel movement yet but does not feel constipated. Patient denies abdominal pain, acid reflux symptoms, nausea, vomiting and diarrhea. She is ambulating frequently for exercise. \par

## 2019-05-09 ENCOUNTER — RX RENEWAL (OUTPATIENT)
Age: 59
End: 2019-05-09

## 2019-05-23 ENCOUNTER — APPOINTMENT (OUTPATIENT)
Dept: BARIATRICS | Facility: CLINIC | Age: 59
End: 2019-05-23
Payer: COMMERCIAL

## 2019-05-23 VITALS
DIASTOLIC BLOOD PRESSURE: 70 MMHG | BODY MASS INDEX: 38.89 KG/M2 | OXYGEN SATURATION: 98 % | HEIGHT: 61 IN | SYSTOLIC BLOOD PRESSURE: 130 MMHG | HEART RATE: 83 BPM | WEIGHT: 206 LBS

## 2019-05-23 PROBLEM — E66.01 MORBID (SEVERE) OBESITY DUE TO EXCESS CALORIES: Chronic | Status: ACTIVE | Noted: 2019-03-26

## 2019-05-23 PROBLEM — K29.70 GASTRITIS, UNSPECIFIED, WITHOUT BLEEDING: Chronic | Status: ACTIVE | Noted: 2019-03-26

## 2019-05-23 PROBLEM — I10 ESSENTIAL (PRIMARY) HYPERTENSION: Chronic | Status: ACTIVE | Noted: 2019-03-26

## 2019-05-23 PROCEDURE — 99024 POSTOP FOLLOW-UP VISIT: CPT

## 2019-05-23 RX ORDER — OMEPRAZOLE 20 MG/1
20 CAPSULE, DELAYED RELEASE ORAL DAILY
Qty: 30 | Refills: 2 | Status: COMPLETED | COMMUNITY
Start: 2019-04-02 | End: 2019-05-23

## 2019-05-23 NOTE — PHYSICAL EXAM
[Obese, well nourished, in no acute distress] : obese, well nourished, in no acute distress [Normal] : affect appropriate [de-identified] : soft and nontender, no hernias appreciated. incisions well healed.

## 2019-05-23 NOTE — HISTORY OF PRESENT ILLNESS
[Procedure: ___] : Procedure performed: [unfilled]  [Date of Surgery: ___] : Date of Surgery:   [unfilled] [Surgeon Name:   ___] : Surgeon Name: Dr. VALVERDE [Pre-Op Weight ___] : Pre-op weight was [unfilled] lbs [de-identified] : 58 year year old female 6 weeks s/p laparoscopic sleeve gastrectomy with hiatal hernia repair presents for post operative visit. She lost 17 lbs since last visit. Patient is consuming the appropriate amount of protein and water daily. She is tolerating advancement of diet to regular foods. Patient uses Miralax every few days to prevent constipation. Patient is taking vitamins and omeprazole daily. She denies acid reflux symptoms, nausea, vomiting, diarrhea and constipation. She is walking 7-10K steps a day.  She continues to use CPAP nightly.

## 2019-05-23 NOTE — REVIEW OF SYSTEMS
[Recent Change In Weight] : ~T recent weight change [Negative] : Psychiatric [Chills] : no chills [Night Sweats] : no night sweats [Fatigue] : no fatigue [Abdominal Pain] : no abdominal pain [Vomiting] : no vomiting [Constipation] : no constipation [Diarrhea] : no diarrhea [Reflux/Heartburn] : no reflux/ heartburn [Hernia] : no hernia [FreeTextEntry2] : weight loss

## 2019-05-23 NOTE — ASSESSMENT
[FreeTextEntry1] : 58 year old female 6 weeks s/p laparoscopic sleeve gastrectomy and hiatal hernia repair presents for post operative visit. She is doing well and continues to lose weight. Incisions are healing appropriately. The patient was encouraged to continue low fat, low carbohydrate and high protein diet.  Patient was advised to increase ambulation and start strength exercises. \par \par Nutrition and exercise counseling provided.\par Continue Vitamins \par Trial stopping omeprazole\par Follow up in 2 months with labs prior to visit\par Call with any questions or concerns\par

## 2019-07-23 ENCOUNTER — APPOINTMENT (OUTPATIENT)
Dept: BARIATRICS | Facility: CLINIC | Age: 59
End: 2019-07-23
Payer: COMMERCIAL

## 2019-07-23 VITALS
HEART RATE: 60 BPM | HEIGHT: 61 IN | OXYGEN SATURATION: 98 % | WEIGHT: 186.29 LBS | DIASTOLIC BLOOD PRESSURE: 74 MMHG | SYSTOLIC BLOOD PRESSURE: 130 MMHG | BODY MASS INDEX: 35.17 KG/M2

## 2019-07-23 DIAGNOSIS — E66.01 MORBID (SEVERE) OBESITY DUE TO EXCESS CALORIES: ICD-10-CM

## 2019-07-23 LAB
25(OH)D3 SERPL-MCNC: 47.3 NG/ML
ALBUMIN SERPL ELPH-MCNC: 4.1 G/DL
ALP BLD-CCNC: 68 U/L
ALT SERPL-CCNC: 15 U/L
ANION GAP SERPL CALC-SCNC: 14 MMOL/L
AST SERPL-CCNC: 22 U/L
BASOPHILS # BLD AUTO: 0.07 K/UL
BASOPHILS NFR BLD AUTO: 1.4 %
BILIRUB SERPL-MCNC: 0.6 MG/DL
BUN SERPL-MCNC: 16 MG/DL
CALCIUM SERPL-MCNC: 9.6 MG/DL
CHLORIDE SERPL-SCNC: 103 MMOL/L
CO2 SERPL-SCNC: 24 MMOL/L
CREAT SERPL-MCNC: 0.76 MG/DL
EOSINOPHIL # BLD AUTO: 0.21 K/UL
EOSINOPHIL NFR BLD AUTO: 4.3 %
ESTIMATED AVERAGE GLUCOSE: 134 MG/DL
FERRITIN SERPL-MCNC: 167 NG/ML
FOLATE SERPL-MCNC: >20 NG/ML
GLUCOSE SERPL-MCNC: 96 MG/DL
HBA1C MFR BLD HPLC: 6.3 %
HCT VFR BLD CALC: 38 %
HGB BLD-MCNC: 12.1 G/DL
IMM GRANULOCYTES NFR BLD AUTO: 0.2 %
IRON SATN MFR SERPL: 21 %
IRON SERPL-MCNC: 60 UG/DL
LYMPHOCYTES # BLD AUTO: 1.88 K/UL
LYMPHOCYTES NFR BLD AUTO: 38.1 %
MAN DIFF?: NORMAL
MCHC RBC-ENTMCNC: 27.8 PG
MCHC RBC-ENTMCNC: 31.8 GM/DL
MCV RBC AUTO: 87.2 FL
MONOCYTES # BLD AUTO: 0.36 K/UL
MONOCYTES NFR BLD AUTO: 7.3 %
NEUTROPHILS # BLD AUTO: 2.41 K/UL
NEUTROPHILS NFR BLD AUTO: 48.7 %
PLATELET # BLD AUTO: 308 K/UL
POTASSIUM SERPL-SCNC: 4.2 MMOL/L
PROT SERPL-MCNC: 6.2 G/DL
RBC # BLD: 4.36 M/UL
RBC # FLD: 14.5 %
SODIUM SERPL-SCNC: 141 MMOL/L
TIBC SERPL-MCNC: 285 UG/DL
UIBC SERPL-MCNC: 225 UG/DL
VIT B12 SERPL-MCNC: 1486 PG/ML
WBC # FLD AUTO: 4.94 K/UL

## 2019-07-23 PROCEDURE — 99214 OFFICE O/P EST MOD 30 MIN: CPT

## 2019-07-23 RX ORDER — ATORVASTATIN CALCIUM 10 MG/1
10 TABLET, FILM COATED ORAL
Refills: 0 | Status: ACTIVE | COMMUNITY

## 2019-07-23 RX ORDER — EMPAGLIFLOZIN 10 MG/1
10 TABLET, FILM COATED ORAL
Refills: 0 | Status: ACTIVE | COMMUNITY

## 2019-07-23 RX ORDER — CANAGLIFLOZIN 300 MG/1
300 TABLET, FILM COATED ORAL DAILY
Qty: 90 | Refills: 3 | Status: DISCONTINUED | COMMUNITY
End: 2019-07-23

## 2019-07-24 LAB
VIT A SERPL-MCNC: 39.1 UG/DL
ZINC SERPL-MCNC: 71 UG/DL

## 2019-07-29 NOTE — ASSESSMENT
[FreeTextEntry1] : 58 year old female over three months s/p laparoscopic sleeve gastrectomy and hiatal hernia repair presents for post operative visit. She is doing well and continues to lose weight. Incisions healed appropriately. The patient was encouraged to continue low fat, low carbohydrate and high protein diet.  Patient was advised to increase ambulation and start strength exercises.  Reviewed resulted labs with patient. \par \par Nutrition and exercise counseling provided.\par Continue Vitamins \par Follow up pending lab results\par Follow up in 2.5 months\par Call with any questions or concerns\par

## 2019-07-29 NOTE — PHYSICAL EXAM
[Obese, well nourished, in no acute distress] : obese, well nourished, in no acute distress [Normal] : affect appropriate [de-identified] : soft and nontender, no hernias appreciated. incisions well healed.  [de-identified] : EOMI, anicteric

## 2019-07-29 NOTE — REVIEW OF SYSTEMS
[Recent Change In Weight] : ~T recent weight change [Negative] : Neurological [Chills] : no chills [Fatigue] : no fatigue [Night Sweats] : no night sweats [Vomiting] : no vomiting [Constipation] : no constipation [Abdominal Pain] : no abdominal pain [Reflux/Heartburn] : no reflux/ heartburn [Diarrhea] : no diarrhea [Hernia] : no hernia [FreeTextEntry2] : weight loss

## 2019-07-30 ENCOUNTER — NON-APPOINTMENT (OUTPATIENT)
Age: 59
End: 2019-07-30

## 2019-07-30 ENCOUNTER — APPOINTMENT (OUTPATIENT)
Dept: OPHTHALMOLOGY | Facility: CLINIC | Age: 59
End: 2019-07-30
Payer: COMMERCIAL

## 2019-07-30 PROCEDURE — 92014 COMPRE OPH EXAM EST PT 1/>: CPT

## 2019-08-07 ENCOUNTER — APPOINTMENT (OUTPATIENT)
Dept: BARIATRICS/WEIGHT MGMT | Facility: CLINIC | Age: 59
End: 2019-08-07

## 2019-08-08 LAB
VIT B1 SERPL-MCNC: 183.8 NMOL/L
VIT B2 SERPL-MCNC: 191 UG/L
VIT B6 SERPL-MCNC: 13.5 UG/L

## 2019-10-08 ENCOUNTER — APPOINTMENT (OUTPATIENT)
Dept: BARIATRICS | Facility: CLINIC | Age: 59
End: 2019-10-08
Payer: COMMERCIAL

## 2019-10-08 VITALS
DIASTOLIC BLOOD PRESSURE: 78 MMHG | OXYGEN SATURATION: 99 % | SYSTOLIC BLOOD PRESSURE: 128 MMHG | HEART RATE: 59 BPM | WEIGHT: 168.43 LBS | BODY MASS INDEX: 31.8 KG/M2 | HEIGHT: 61 IN

## 2019-10-08 DIAGNOSIS — I10 ESSENTIAL (PRIMARY) HYPERTENSION: ICD-10-CM

## 2019-10-08 DIAGNOSIS — E11.9 TYPE 2 DIABETES MELLITUS W/OUT COMPLICATIONS: ICD-10-CM

## 2019-10-08 PROCEDURE — 99214 OFFICE O/P EST MOD 30 MIN: CPT

## 2019-10-11 NOTE — PHYSICAL EXAM
[Obese, well nourished, in no acute distress] : obese, well nourished, in no acute distress [Normal] : affect appropriate [de-identified] : normoactive bowel sounds, soft and non tender, no hepatosplenomegaly or masses appreciated.

## 2019-10-11 NOTE — ASSESSMENT
[FreeTextEntry1] : 58 year old F  s/p lap sleeve gastrectomy and intra- op EGD with hiatal hernia repair. Weight loss since last visit.\par \par SCRIPT GIVEN FOR ROUTINE LABS TO BE PERFORMED PRIOR TO NEXT APPOINTMENT.\par Will continue multivitamins and continue protein and liquid intake as instructed.\par Call for any questions or concerns.\par \par Nutritional counseling has been provided. The patient is encouraged to remain calorie conscious and continue a low fat, low carbohydrate, protein focus diet. Pt encouraged to participate in a daily exercise regimen incorporating cardio and  strength training. \par \par Dr. Neal saw patient today.\par \par Return to office in 12 weeks or earlier if any concerns.\par

## 2019-10-11 NOTE — REVIEW OF SYSTEMS
[Recent Change In Weight] : ~T recent weight change [Constipation] : constipation [Negative] : Integumentary [Dysphagia] : no dysphagia [Fever] : no fever [Chest Pain] : no chest pain [Lower Ext Edema] : no lower extremity edema [Shortness Of Breath] : no shortness of breath [Cough] : no cough [SOB on Exertion] : no shortness of breath during exertion [Abdominal Pain] : no abdominal pain [Vomiting] : no vomiting [Diarrhea] : no diarrhea [Reflux/Heartburn] : no reflux/ heartburn [Dizziness] : no dizziness [Confused] : no confusion [Suicidal] : not suicidal [Insomnia] : no insomnia [FreeTextEntry7] : mild constipation  [FreeTextEntry2] : weight loss since last visit.

## 2019-10-11 NOTE — HISTORY OF PRESENT ILLNESS
[Procedure: ___] : Procedure performed: [unfilled]  [Date of Surgery: ___] : Date of Surgery:   [unfilled] [Surgeon Name:   ___] : Surgeon Name: Dr. VALVERDE [Pre-Op Weight ___] : Pre-op weight was [unfilled] lbs [de-identified] : 58 year old F  s/p lap sleeve gastrectomy and intra- op EGD with hiatal hernia repair. Weight loss since last visit.Denies any food intolerances. Pt is consuming consistent protein focus meals and consuming a sufficient amount of zero calorie liquid per day.Consuming 3 meals per day- occasional snacking between 1 meal per day.Patient is taking vitamin supplements as directed. Mild constipation - taking Miralax with some relief.. No reflux symptoms. Exercising regularly incorporating cardio and strength training- gym 4 x per week. Plan to have routine blood work performed prior to next visit.

## 2019-10-30 ENCOUNTER — RX RENEWAL (OUTPATIENT)
Age: 59
End: 2019-10-30

## 2020-01-14 ENCOUNTER — APPOINTMENT (OUTPATIENT)
Dept: BARIATRICS | Facility: CLINIC | Age: 60
End: 2020-01-14
Payer: COMMERCIAL

## 2020-01-14 VITALS
HEIGHT: 61 IN | OXYGEN SATURATION: 99 % | BODY MASS INDEX: 28.84 KG/M2 | WEIGHT: 152.78 LBS | DIASTOLIC BLOOD PRESSURE: 70 MMHG | HEART RATE: 52 BPM | SYSTOLIC BLOOD PRESSURE: 108 MMHG

## 2020-01-14 DIAGNOSIS — E66.9 OBESITY, UNSPECIFIED: ICD-10-CM

## 2020-01-14 DIAGNOSIS — Z87.898 PERSONAL HISTORY OF OTHER SPECIFIED CONDITIONS: ICD-10-CM

## 2020-01-14 PROCEDURE — 99214 OFFICE O/P EST MOD 30 MIN: CPT

## 2020-01-14 RX ORDER — HYDROCHLOROTHIAZIDE 25 MG/1
25 TABLET ORAL DAILY
Qty: 30 | Refills: 2 | Status: COMPLETED | COMMUNITY
End: 2020-01-14

## 2020-01-14 NOTE — ASSESSMENT
[FreeTextEntry1] : 58 year old female over three months s/p laparoscopic sleeve gastrectomy and hiatal hernia repair presents for post operative visit. She is doing well and continues to lose weight. Incisions healed appropriately. The patient was encouraged to continue low fat, low carbohydrate and high protein diet.  Patient was advised to increase ambulation and start strength exercises.  Reviewed resulted labs with patient. \par \par Nutrition and exercise counseling provided.\par Continue Vitamins \par Follow up in 3 months\par Call with any questions or concerns\par

## 2020-01-14 NOTE — HISTORY OF PRESENT ILLNESS
[Procedure: ___] : Procedure performed: [unfilled]  [Date of Surgery: ___] : Date of Surgery:   [unfilled] [Surgeon Name:   ___] : Surgeon Name: Dr. VALVERDE [Pre-Op Weight ___] : Pre-op weight was [unfilled] lbs [de-identified] : 59 year old female nine months s/p laparoscopic sleeve gastrectomy with hiatal hernia repair presents for follow up visit. She lost 12 lbs since last visit. Patient is consuming the appropriate amount of protein and water daily.  She is taking vitamins.  She denies acid reflux symptoms, nausea, vomiting, diarrhea and constipation. She is walking 14 K steps a day and does orange theory four times a week.  She continues to use CPAP nightly. Patient recently had labs drawn.

## 2020-01-14 NOTE — PHYSICAL EXAM
[Obese, well nourished, in no acute distress] : obese, well nourished, in no acute distress [Normal] : affect appropriate [de-identified] : EOMI, anicteric  [de-identified] : soft and nontender, no hernias appreciated. incisions well healed.

## 2020-01-16 ENCOUNTER — RESULT REVIEW (OUTPATIENT)
Age: 60
End: 2020-01-16

## 2020-01-16 ENCOUNTER — OUTPATIENT (OUTPATIENT)
Dept: OUTPATIENT SERVICES | Facility: HOSPITAL | Age: 60
LOS: 1 days | End: 2020-01-16
Payer: COMMERCIAL

## 2020-01-16 ENCOUNTER — APPOINTMENT (OUTPATIENT)
Dept: MAMMOGRAPHY | Facility: CLINIC | Age: 60
End: 2020-01-16
Payer: COMMERCIAL

## 2020-01-16 DIAGNOSIS — Z98.49 CATARACT EXTRACTION STATUS, UNSPECIFIED EYE: Chronic | ICD-10-CM

## 2020-01-16 DIAGNOSIS — Z98.891 HISTORY OF UTERINE SCAR FROM PREVIOUS SURGERY: Chronic | ICD-10-CM

## 2020-01-16 DIAGNOSIS — Z12.31 ENCOUNTER FOR SCREENING MAMMOGRAM FOR MALIGNANT NEOPLASM OF BREAST: ICD-10-CM

## 2020-01-16 PROCEDURE — 77067 SCR MAMMO BI INCL CAD: CPT

## 2020-01-16 PROCEDURE — 77063 BREAST TOMOSYNTHESIS BI: CPT | Mod: 26

## 2020-01-16 PROCEDURE — 77063 BREAST TOMOSYNTHESIS BI: CPT

## 2020-01-16 PROCEDURE — 77067 SCR MAMMO BI INCL CAD: CPT | Mod: 26

## 2020-01-21 LAB
25(OH)D3 SERPL-MCNC: 48.7 NG/ML
ALBUMIN SERPL ELPH-MCNC: 4.1 G/DL
ALP BLD-CCNC: 58 U/L
ALT SERPL-CCNC: 26 U/L
ANION GAP SERPL CALC-SCNC: 12 MMOL/L
AST SERPL-CCNC: 27 U/L
BASOPHILS # BLD AUTO: 0.06 K/UL
BASOPHILS NFR BLD AUTO: 0.9 %
BILIRUB SERPL-MCNC: 0.6 MG/DL
BUN SERPL-MCNC: 20 MG/DL
CALCIUM SERPL-MCNC: 9.5 MG/DL
CHLORIDE SERPL-SCNC: 104 MMOL/L
CHOLEST SERPL-MCNC: 185 MG/DL
CHOLEST/HDLC SERPL: 3.4 RATIO
CO2 SERPL-SCNC: 28 MMOL/L
CREAT SERPL-MCNC: 0.81 MG/DL
EOSINOPHIL # BLD AUTO: 0.48 K/UL
EOSINOPHIL NFR BLD AUTO: 7.5 %
ESTIMATED AVERAGE GLUCOSE: 131 MG/DL
FOLATE SERPL-MCNC: >20 NG/ML
GLUCOSE SERPL-MCNC: 136 MG/DL
HBA1C MFR BLD HPLC: 6.2 %
HCT VFR BLD CALC: 39.5 %
HDLC SERPL-MCNC: 55 MG/DL
HGB BLD-MCNC: 12.5 G/DL
IMM GRANULOCYTES NFR BLD AUTO: 0.3 %
IRON SATN MFR SERPL: 20 %
IRON SERPL-MCNC: 59 UG/DL
LDLC SERPL CALC-MCNC: 114 MG/DL
LYMPHOCYTES # BLD AUTO: 2.05 K/UL
LYMPHOCYTES NFR BLD AUTO: 32 %
MAN DIFF?: NORMAL
MCHC RBC-ENTMCNC: 28.4 PG
MCHC RBC-ENTMCNC: 31.6 GM/DL
MCV RBC AUTO: 89.8 FL
MONOCYTES # BLD AUTO: 0.45 K/UL
MONOCYTES NFR BLD AUTO: 7 %
NEUTROPHILS # BLD AUTO: 3.35 K/UL
NEUTROPHILS NFR BLD AUTO: 52.3 %
PLATELET # BLD AUTO: 292 K/UL
POTASSIUM SERPL-SCNC: 4.2 MMOL/L
PROT SERPL-MCNC: 6.2 G/DL
RBC # BLD: 4.4 M/UL
RBC # FLD: 13.7 %
SODIUM SERPL-SCNC: 144 MMOL/L
TIBC SERPL-MCNC: 291 UG/DL
TRIGL SERPL-MCNC: 82 MG/DL
TSH SERPL-ACNC: 2.03 UIU/ML
UIBC SERPL-MCNC: 232 UG/DL
VIT A SERPL-MCNC: 48.9 UG/DL
VIT B1 SERPL-MCNC: 196.9 NMOL/L
VIT B12 SERPL-MCNC: 1811 PG/ML
VIT B6 SERPL-MCNC: 22.8 UG/L
WBC # FLD AUTO: 6.41 K/UL
ZINC SERPL-MCNC: 47 UG/DL

## 2020-01-29 ENCOUNTER — APPOINTMENT (OUTPATIENT)
Dept: MAMMOGRAPHY | Facility: CLINIC | Age: 60
End: 2020-01-29
Payer: COMMERCIAL

## 2020-01-29 ENCOUNTER — OUTPATIENT (OUTPATIENT)
Dept: OUTPATIENT SERVICES | Facility: HOSPITAL | Age: 60
LOS: 1 days | End: 2020-01-29
Payer: COMMERCIAL

## 2020-01-29 ENCOUNTER — APPOINTMENT (OUTPATIENT)
Dept: ULTRASOUND IMAGING | Facility: CLINIC | Age: 60
End: 2020-01-29
Payer: COMMERCIAL

## 2020-01-29 DIAGNOSIS — Z00.8 ENCOUNTER FOR OTHER GENERAL EXAMINATION: ICD-10-CM

## 2020-01-29 DIAGNOSIS — Z98.49 CATARACT EXTRACTION STATUS, UNSPECIFIED EYE: Chronic | ICD-10-CM

## 2020-01-29 DIAGNOSIS — Z98.891 HISTORY OF UTERINE SCAR FROM PREVIOUS SURGERY: Chronic | ICD-10-CM

## 2020-01-29 PROCEDURE — 76642 ULTRASOUND BREAST LIMITED: CPT

## 2020-01-29 PROCEDURE — 77065 DX MAMMO INCL CAD UNI: CPT

## 2020-01-29 PROCEDURE — G0279: CPT | Mod: 26

## 2020-01-29 PROCEDURE — 77065 DX MAMMO INCL CAD UNI: CPT | Mod: 26,RT

## 2020-01-29 PROCEDURE — 76642 ULTRASOUND BREAST LIMITED: CPT | Mod: 26,RT

## 2020-01-29 PROCEDURE — G0279: CPT

## 2020-02-18 ENCOUNTER — RX RENEWAL (OUTPATIENT)
Age: 60
End: 2020-02-18

## 2020-04-21 ENCOUNTER — APPOINTMENT (OUTPATIENT)
Dept: BARIATRICS | Facility: CLINIC | Age: 60
End: 2020-04-21
Payer: COMMERCIAL

## 2020-04-21 VITALS — WEIGHT: 153 LBS | HEIGHT: 61 IN | BODY MASS INDEX: 28.89 KG/M2

## 2020-04-21 DIAGNOSIS — E66.3 OVERWEIGHT: ICD-10-CM

## 2020-04-21 DIAGNOSIS — G47.33 OBSTRUCTIVE SLEEP APNEA (ADULT) (PEDIATRIC): ICD-10-CM

## 2020-04-21 DIAGNOSIS — Z98.84 BARIATRIC SURGERY STATUS: ICD-10-CM

## 2020-04-21 PROCEDURE — 99214 OFFICE O/P EST MOD 30 MIN: CPT | Mod: 95

## 2020-04-21 RX ORDER — ASCORBIC ACID 500 MG
TABLET ORAL
Refills: 0 | Status: ACTIVE | COMMUNITY

## 2020-04-21 RX ORDER — OMEPRAZOLE 40 MG/1
40 CAPSULE, DELAYED RELEASE ORAL
Qty: 90 | Refills: 0 | Status: DISCONTINUED | COMMUNITY
Start: 2019-02-15 | End: 2020-04-21

## 2020-04-21 RX ORDER — MULTIVIT-MIN/VIT C/HERB NO.124 250-11.66
TABLET,CHEWABLE ORAL
Refills: 0 | Status: ACTIVE | COMMUNITY

## 2020-06-15 ENCOUNTER — APPOINTMENT (OUTPATIENT)
Dept: BARIATRICS | Facility: CLINIC | Age: 60
End: 2020-06-15
Payer: COMMERCIAL

## 2020-06-15 VITALS — HEIGHT: 61 IN | BODY MASS INDEX: 29.07 KG/M2 | WEIGHT: 154 LBS

## 2020-06-15 PROCEDURE — 99214 OFFICE O/P EST MOD 30 MIN: CPT | Mod: 95

## 2020-06-15 NOTE — REVIEW OF SYSTEMS
[Negative] : Psychiatric [Chills] : no chills [Night Sweats] : no night sweats [Recent Change In Weight] : ~T no recent weight change [Fatigue] : no fatigue [Abdominal Pain] : no abdominal pain [Diarrhea] : no diarrhea [Constipation] : no constipation [Vomiting] : no vomiting [Hernia] : no hernia [Reflux/Heartburn] : no reflux/ heartburn

## 2020-06-15 NOTE — PHYSICAL EXAM
[Obese, well nourished, in no acute distress] : obese, well nourished, in no acute distress [Normal] : affect appropriate [de-identified] : EOMI, anicteric

## 2020-06-15 NOTE — ASSESSMENT
[FreeTextEntry1] : 58 year old female 1 year s/p laparoscopic sleeve gastrectomy and hiatal hernia repair presents for follow up visit. She is doing well and maintaining weight. The patient was encouraged to continue low fat, low carbohydrate and high protein diet. Patient declined appointments with either nutritionist or psychologist to help with grazing at this time.  Patient was advised to continue walking with goal of 10K steps daily and restart strength exercises with virtual videos. \par \par Nutrition and exercise counseling provided.\par Continue Vitamins \par Follow up with pulmonologist about continuation of AutoPAP\par Follow up in 6-8 weeks\par Call with any questions or concerns\par

## 2020-06-15 NOTE — REVIEW OF SYSTEMS
[Negative] : Psychiatric [Night Sweats] : no night sweats [Chills] : no chills [Fatigue] : no fatigue [Abdominal Pain] : no abdominal pain [Recent Change In Weight] : ~T no recent weight change [Constipation] : no constipation [Diarrhea] : no diarrhea [Vomiting] : no vomiting [Reflux/Heartburn] : no reflux/ heartburn [Hernia] : no hernia

## 2020-06-15 NOTE — HISTORY OF PRESENT ILLNESS
[Procedure: ___] : Procedure performed: [unfilled]  [Surgeon Name:   ___] : Surgeon Name: Dr. VALVERDE [Date of Surgery: ___] : Date of Surgery:   [unfilled] [Pre-Op Weight ___] : Pre-op weight was [unfilled] lbs [de-identified] : COVID-19\par \par 59 year old female  s/p laparoscopic sleeve gastrectomy with hiatal hernia repair presents for follow up visit. She maintained weight since last visit. Patient is consuming the appropriate amount of protein and water daily.  She reports decrease in grazing.  She is taking vitamins.  She denies acid reflux symptoms, nausea, vomiting, diarrhea and constipation. She is walking 10-13 K steps a day.  Patient recently had labs drawn January 2020.

## 2020-06-15 NOTE — ASSESSMENT
[FreeTextEntry1] : 58 year old female 1 year s/p laparoscopic sleeve gastrectomy and hiatal hernia repair presents for follow up visit. She is doing well and maintaining weight. The patient was encouraged to continue low fat, low carbohydrate and high protein diet. Patient declined appointments with either nutritionist or psychologist to help with grazing at this time.  Patient was advised to continue walking with goal of >10K steps daily and start strength exercises with virtual videos. \par \par Nutrition and exercise counseling provided.\par Continue Vitamins \par Follow up in 3 months\par Call with any questions or concerns\par \par Time was spent before and after visit reviewing patient records.\par

## 2020-06-15 NOTE — REASON FOR VISIT
[Home] : at home, [unfilled] , at the time of the visit. [Medical Office: ([facility])___] : at the medical office located in  [Verbal consent obtained from patient] : the patient, [unfilled] [Follow-Up Visit] : a follow-up visit for [S/P Bariatric Surgery] : s/p bariatric surgery

## 2020-06-15 NOTE — PHYSICAL EXAM
[Obese, well nourished, in no acute distress] : obese, well nourished, in no acute distress [Normal] : affect appropriate [de-identified] : EOMI, anicteric

## 2020-06-15 NOTE — HISTORY OF PRESENT ILLNESS
[Home] : at home, [unfilled] , at the time of the visit. [Other Location: e.g. Home (Enter Location, City,State)___] : at [unfilled] [Procedure: ___] : Procedure performed: [unfilled]  [Date of Surgery: ___] : Date of Surgery:   [unfilled] [Pre-Op Weight ___] : Pre-op weight was [unfilled] lbs [Surgeon Name:   ___] : Surgeon Name: Dr. VALVERDE [de-identified] : 59 year old female 1 year s/p laparoscopic sleeve gastrectomy with hiatal hernia repair presents for follow up visit. She maintained weight since last visit. Patient is consuming the appropriate amount of protein and water daily.  She reports challenging with grazing during the day now that she is working from home and sees family members eating throughout the day. She is taking vitamins.  She denies acid reflux symptoms, nausea, vomiting, diarrhea and constipation. She is walking but no longer going to [employer] due to pandemic.  She continues to use CPAP nightly. Patient recently had labs drawn.

## 2020-07-21 ENCOUNTER — RESULT REVIEW (OUTPATIENT)
Age: 60
End: 2020-07-21

## 2020-08-27 ENCOUNTER — APPOINTMENT (OUTPATIENT)
Dept: OPHTHALMOLOGY | Facility: CLINIC | Age: 60
End: 2020-08-27
Payer: COMMERCIAL

## 2020-08-27 ENCOUNTER — NON-APPOINTMENT (OUTPATIENT)
Age: 60
End: 2020-08-27

## 2020-08-27 PROCEDURE — 92014 COMPRE OPH EXAM EST PT 1/>: CPT

## 2020-12-21 PROBLEM — Z12.11 ENCOUNTER FOR SCREENING COLONOSCOPY: Status: RESOLVED | Noted: 2019-01-18 | Resolved: 2020-12-21

## 2020-12-21 PROBLEM — Z01.818 ENCOUNTER FOR DIAGNOSTIC ENDOSCOPY: Status: RESOLVED | Noted: 2019-01-18 | Resolved: 2020-12-21

## 2021-02-24 ENCOUNTER — OUTPATIENT (OUTPATIENT)
Dept: OUTPATIENT SERVICES | Facility: HOSPITAL | Age: 61
LOS: 1 days | End: 2021-02-24
Payer: COMMERCIAL

## 2021-02-24 ENCOUNTER — APPOINTMENT (OUTPATIENT)
Dept: MAMMOGRAPHY | Facility: CLINIC | Age: 61
End: 2021-02-24
Payer: COMMERCIAL

## 2021-02-24 ENCOUNTER — RESULT REVIEW (OUTPATIENT)
Age: 61
End: 2021-02-24

## 2021-02-24 ENCOUNTER — APPOINTMENT (OUTPATIENT)
Dept: RADIOLOGY | Facility: CLINIC | Age: 61
End: 2021-02-24

## 2021-02-24 DIAGNOSIS — Z12.31 ENCOUNTER FOR SCREENING MAMMOGRAM FOR MALIGNANT NEOPLASM OF BREAST: ICD-10-CM

## 2021-02-24 DIAGNOSIS — Z13.820 ENCOUNTER FOR SCREENING FOR OSTEOPOROSIS: ICD-10-CM

## 2021-02-24 DIAGNOSIS — Z98.891 HISTORY OF UTERINE SCAR FROM PREVIOUS SURGERY: Chronic | ICD-10-CM

## 2021-02-24 DIAGNOSIS — Z98.49 CATARACT EXTRACTION STATUS, UNSPECIFIED EYE: Chronic | ICD-10-CM

## 2021-02-24 DIAGNOSIS — Z00.8 ENCOUNTER FOR OTHER GENERAL EXAMINATION: ICD-10-CM

## 2021-02-24 PROCEDURE — 77063 BREAST TOMOSYNTHESIS BI: CPT | Mod: 26

## 2021-02-24 PROCEDURE — 77080 DXA BONE DENSITY AXIAL: CPT | Mod: 26

## 2021-02-24 PROCEDURE — 77067 SCR MAMMO BI INCL CAD: CPT

## 2021-02-24 PROCEDURE — 77080 DXA BONE DENSITY AXIAL: CPT

## 2021-02-24 PROCEDURE — 77067 SCR MAMMO BI INCL CAD: CPT | Mod: 26

## 2021-02-24 PROCEDURE — 77063 BREAST TOMOSYNTHESIS BI: CPT

## 2021-12-28 ENCOUNTER — APPOINTMENT (OUTPATIENT)
Dept: OPHTHALMOLOGY | Facility: CLINIC | Age: 61
End: 2021-12-28
Payer: COMMERCIAL

## 2021-12-28 ENCOUNTER — NON-APPOINTMENT (OUTPATIENT)
Age: 61
End: 2021-12-28

## 2021-12-28 PROCEDURE — 92014 COMPRE OPH EXAM EST PT 1/>: CPT

## 2022-04-12 ENCOUNTER — APPOINTMENT (OUTPATIENT)
Dept: MAMMOGRAPHY | Facility: CLINIC | Age: 62
End: 2022-04-12
Payer: COMMERCIAL

## 2022-04-12 ENCOUNTER — OUTPATIENT (OUTPATIENT)
Dept: OUTPATIENT SERVICES | Facility: HOSPITAL | Age: 62
LOS: 1 days | End: 2022-04-12
Payer: COMMERCIAL

## 2022-04-12 ENCOUNTER — RESULT REVIEW (OUTPATIENT)
Age: 62
End: 2022-04-12

## 2022-04-12 DIAGNOSIS — Z98.891 HISTORY OF UTERINE SCAR FROM PREVIOUS SURGERY: Chronic | ICD-10-CM

## 2022-04-12 DIAGNOSIS — Z00.8 ENCOUNTER FOR OTHER GENERAL EXAMINATION: ICD-10-CM

## 2022-04-12 DIAGNOSIS — Z98.49 CATARACT EXTRACTION STATUS, UNSPECIFIED EYE: Chronic | ICD-10-CM

## 2022-04-12 DIAGNOSIS — Z12.31 ENCOUNTER FOR SCREENING MAMMOGRAM FOR MALIGNANT NEOPLASM OF BREAST: ICD-10-CM

## 2022-04-12 PROCEDURE — 77067 SCR MAMMO BI INCL CAD: CPT | Mod: 26

## 2022-04-12 PROCEDURE — 77067 SCR MAMMO BI INCL CAD: CPT

## 2022-04-12 PROCEDURE — 77063 BREAST TOMOSYNTHESIS BI: CPT | Mod: 26

## 2022-04-12 PROCEDURE — 77063 BREAST TOMOSYNTHESIS BI: CPT

## 2022-09-08 ENCOUNTER — NON-APPOINTMENT (OUTPATIENT)
Age: 62
End: 2022-09-08

## 2022-09-08 ENCOUNTER — APPOINTMENT (OUTPATIENT)
Dept: OPHTHALMOLOGY | Facility: CLINIC | Age: 62
End: 2022-09-08

## 2022-09-08 PROCEDURE — 92014 COMPRE OPH EXAM EST PT 1/>: CPT

## 2023-05-10 ENCOUNTER — APPOINTMENT (OUTPATIENT)
Dept: MAMMOGRAPHY | Facility: CLINIC | Age: 63
End: 2023-05-10
Payer: COMMERCIAL

## 2023-05-10 ENCOUNTER — OUTPATIENT (OUTPATIENT)
Dept: OUTPATIENT SERVICES | Facility: HOSPITAL | Age: 63
LOS: 1 days | End: 2023-05-10
Payer: COMMERCIAL

## 2023-05-10 DIAGNOSIS — Z12.31 ENCOUNTER FOR SCREENING MAMMOGRAM FOR MALIGNANT NEOPLASM OF BREAST: ICD-10-CM

## 2023-05-10 DIAGNOSIS — Z98.891 HISTORY OF UTERINE SCAR FROM PREVIOUS SURGERY: Chronic | ICD-10-CM

## 2023-05-10 DIAGNOSIS — Z98.49 CATARACT EXTRACTION STATUS, UNSPECIFIED EYE: Chronic | ICD-10-CM

## 2023-05-10 DIAGNOSIS — Z00.8 ENCOUNTER FOR OTHER GENERAL EXAMINATION: ICD-10-CM

## 2023-05-10 PROCEDURE — 77067 SCR MAMMO BI INCL CAD: CPT

## 2023-05-10 PROCEDURE — 77063 BREAST TOMOSYNTHESIS BI: CPT | Mod: 26

## 2023-05-10 PROCEDURE — 77063 BREAST TOMOSYNTHESIS BI: CPT

## 2023-05-10 PROCEDURE — 77067 SCR MAMMO BI INCL CAD: CPT | Mod: 26

## 2023-06-08 ENCOUNTER — APPOINTMENT (OUTPATIENT)
Dept: OPHTHALMOLOGY | Facility: CLINIC | Age: 63
End: 2023-06-08
Payer: COMMERCIAL

## 2023-06-08 ENCOUNTER — NON-APPOINTMENT (OUTPATIENT)
Age: 63
End: 2023-06-08

## 2023-06-08 PROCEDURE — 92134 CPTRZ OPH DX IMG PST SGM RTA: CPT

## 2023-06-08 PROCEDURE — 92014 COMPRE OPH EXAM EST PT 1/>: CPT

## 2024-05-31 ENCOUNTER — APPOINTMENT (OUTPATIENT)
Dept: MAMMOGRAPHY | Facility: CLINIC | Age: 64
End: 2024-05-31
Payer: COMMERCIAL

## 2024-05-31 ENCOUNTER — OUTPATIENT (OUTPATIENT)
Dept: OUTPATIENT SERVICES | Facility: HOSPITAL | Age: 64
LOS: 1 days | End: 2024-05-31
Payer: COMMERCIAL

## 2024-05-31 DIAGNOSIS — Z98.49 CATARACT EXTRACTION STATUS, UNSPECIFIED EYE: Chronic | ICD-10-CM

## 2024-05-31 DIAGNOSIS — Z98.891 HISTORY OF UTERINE SCAR FROM PREVIOUS SURGERY: Chronic | ICD-10-CM

## 2024-05-31 DIAGNOSIS — Z00.8 ENCOUNTER FOR OTHER GENERAL EXAMINATION: ICD-10-CM

## 2024-05-31 PROCEDURE — 77063 BREAST TOMOSYNTHESIS BI: CPT | Mod: 26

## 2024-05-31 PROCEDURE — 77067 SCR MAMMO BI INCL CAD: CPT

## 2024-05-31 PROCEDURE — 77067 SCR MAMMO BI INCL CAD: CPT | Mod: 26

## 2024-05-31 PROCEDURE — 77063 BREAST TOMOSYNTHESIS BI: CPT

## 2024-08-29 ENCOUNTER — APPOINTMENT (OUTPATIENT)
Dept: OPHTHALMOLOGY | Facility: CLINIC | Age: 64
End: 2024-08-29
Payer: COMMERCIAL

## 2024-08-29 ENCOUNTER — NON-APPOINTMENT (OUTPATIENT)
Age: 64
End: 2024-08-29

## 2024-08-29 PROCEDURE — 92134 CPTRZ OPH DX IMG PST SGM RTA: CPT

## 2024-08-29 PROCEDURE — 92014 COMPRE OPH EXAM EST PT 1/>: CPT

## 2025-05-02 NOTE — HISTORY OF PRESENT ILLNESS
[Date of Surgery: ___] : Date of Surgery:   [unfilled] [Procedure: ___] : Procedure performed: [unfilled]  [Pre-Op Weight ___] : Pre-op weight was [unfilled] lbs [Surgeon Name:   ___] : Surgeon Name: Dr. VALVERDE [de-identified] : 58 year old female over three months s/p laparoscopic sleeve gastrectomy with hiatal hernia repair presents for post operative visit. She lost 20 lbs since last visit. Patient is consuming the appropriate amount of protein and water daily.  Patient no longer has constipation. She is taking vitamins.  Patient stopped omeprazole as directed and does not have any acid reflux symptoms. She denies nausea, vomiting, diarrhea and constipation. She is walking 10-12 K steps a day and does orange theory three times a week.  She continues to use CPAP nightly.  MEDICATIONS  (STANDING):  aspirin enteric coated 81 milliGRAM(s) Oral daily  calcium carbonate    500 mG (Tums) Chewable 1 Tablet(s) Chew three times a day  levothyroxine 175 MICROGram(s) Oral daily  metoprolol tartrate 50 milliGRAM(s) Oral every 12 hours  NIFEdipine XL 60 milliGRAM(s) Oral daily  polyethylene glycol 3350 17 Gram(s) Oral daily  predniSONE   Tablet 5 milliGRAM(s) Oral daily  senna 2 Tablet(s) Oral at bedtime  sodium zirconium cyclosilicate 10 Gram(s) Oral <User Schedule>  tacrolimus 1 milliGRAM(s) Oral two times a day    MEDICATIONS  (PRN):  allopurinol 100 milliGRAM(s) Oral daily PRN for gout pain  ALPRAZolam 0.25 milliGRAM(s) Oral daily PRN for anxiety  calamine/zinc oxide Lotion 1 Application(s) Topical every 8 hours PRN Itching

## 2025-08-13 ENCOUNTER — OUTPATIENT (OUTPATIENT)
Dept: OUTPATIENT SERVICES | Facility: HOSPITAL | Age: 65
LOS: 1 days | End: 2025-08-13
Payer: COMMERCIAL

## 2025-08-13 ENCOUNTER — APPOINTMENT (OUTPATIENT)
Dept: MAMMOGRAPHY | Facility: CLINIC | Age: 65
End: 2025-08-13
Payer: COMMERCIAL

## 2025-08-13 DIAGNOSIS — Z12.31 ENCOUNTER FOR SCREENING MAMMOGRAM FOR MALIGNANT NEOPLASM OF BREAST: ICD-10-CM

## 2025-08-13 DIAGNOSIS — Z98.891 HISTORY OF UTERINE SCAR FROM PREVIOUS SURGERY: Chronic | ICD-10-CM

## 2025-08-13 DIAGNOSIS — Z98.49 CATARACT EXTRACTION STATUS, UNSPECIFIED EYE: Chronic | ICD-10-CM

## 2025-08-13 DIAGNOSIS — Z00.8 ENCOUNTER FOR OTHER GENERAL EXAMINATION: ICD-10-CM

## 2025-08-13 PROCEDURE — 77063 BREAST TOMOSYNTHESIS BI: CPT

## 2025-08-13 PROCEDURE — 77067 SCR MAMMO BI INCL CAD: CPT | Mod: 26

## 2025-08-13 PROCEDURE — 77067 SCR MAMMO BI INCL CAD: CPT

## 2025-08-13 PROCEDURE — 77063 BREAST TOMOSYNTHESIS BI: CPT | Mod: 26

## 2025-08-18 ENCOUNTER — NON-APPOINTMENT (OUTPATIENT)
Age: 65
End: 2025-08-18

## 2025-08-20 ENCOUNTER — APPOINTMENT (OUTPATIENT)
Dept: CARDIOLOGY | Facility: CLINIC | Age: 65
End: 2025-08-20
Payer: COMMERCIAL

## 2025-08-20 VITALS
OXYGEN SATURATION: 99 % | HEART RATE: 95 BPM | BODY MASS INDEX: 30.8 KG/M2 | DIASTOLIC BLOOD PRESSURE: 98 MMHG | WEIGHT: 163.14 LBS | HEIGHT: 61 IN | SYSTOLIC BLOOD PRESSURE: 182 MMHG

## 2025-08-20 DIAGNOSIS — I10 ESSENTIAL (PRIMARY) HYPERTENSION: ICD-10-CM

## 2025-08-20 DIAGNOSIS — I49.3 VENTRICULAR PREMATURE DEPOLARIZATION: ICD-10-CM

## 2025-08-20 PROCEDURE — 93000 ELECTROCARDIOGRAM COMPLETE: CPT

## 2025-08-20 PROCEDURE — G2211 COMPLEX E/M VISIT ADD ON: CPT | Mod: NC

## 2025-08-20 PROCEDURE — 99204 OFFICE O/P NEW MOD 45 MIN: CPT

## 2025-08-20 RX ORDER — RAMIPRIL 10 MG/1
10 CAPSULE ORAL DAILY
Qty: 30 | Refills: 5 | Status: ACTIVE | COMMUNITY
Start: 1900-01-01 | End: 1900-01-01

## 2025-08-20 RX ORDER — SITAGLIPTIN 100 MG/1
100 TABLET, FILM COATED ORAL DAILY
Refills: 0 | Status: ACTIVE | COMMUNITY

## 2025-08-20 RX ORDER — GABAPENTIN 400 MG
400 TABLET ORAL
Refills: 0 | Status: ACTIVE | COMMUNITY

## 2025-09-18 ENCOUNTER — APPOINTMENT (OUTPATIENT)
Dept: CARDIOLOGY | Facility: CLINIC | Age: 65
End: 2025-09-18
Payer: COMMERCIAL

## 2025-09-18 VITALS
HEIGHT: 61 IN | OXYGEN SATURATION: 98 % | BODY MASS INDEX: 30.96 KG/M2 | DIASTOLIC BLOOD PRESSURE: 94 MMHG | WEIGHT: 164 LBS | SYSTOLIC BLOOD PRESSURE: 158 MMHG | HEART RATE: 96 BPM

## 2025-09-18 DIAGNOSIS — I10 ESSENTIAL (PRIMARY) HYPERTENSION: ICD-10-CM

## 2025-09-18 DIAGNOSIS — I49.3 VENTRICULAR PREMATURE DEPOLARIZATION: ICD-10-CM

## 2025-09-18 PROCEDURE — 99213 OFFICE O/P EST LOW 20 MIN: CPT

## 2025-09-18 PROCEDURE — G2211 COMPLEX E/M VISIT ADD ON: CPT | Mod: NC

## 2025-09-18 PROCEDURE — 93306 TTE W/DOPPLER COMPLETE: CPT

## 2025-09-18 RX ORDER — HYDROCHLOROTHIAZIDE 25 MG/1
25 TABLET ORAL DAILY
Qty: 30 | Refills: 5 | Status: ACTIVE | COMMUNITY
Start: 2025-09-18 | End: 1900-01-01